# Patient Record
Sex: MALE | Race: WHITE | NOT HISPANIC OR LATINO | Employment: OTHER | URBAN - NONMETROPOLITAN AREA
[De-identification: names, ages, dates, MRNs, and addresses within clinical notes are randomized per-mention and may not be internally consistent; named-entity substitution may affect disease eponyms.]

---

## 2018-01-19 ENCOUNTER — OFFICE VISIT CONVERTED (OUTPATIENT)
Dept: FAMILY MEDICINE CLINIC | Age: 83
End: 2018-01-19
Attending: FAMILY MEDICINE

## 2018-01-20 LAB
ALBUMIN SERPL-MCNC: 3.8 G/DL
ALBUMIN/GLOB SERPL: 1.7 {RATIO}
ALP SERPL-CCNC: 62 IU/L
ALT SERPL-CCNC: 15 IU/L
AST SERPL-CCNC: 24 IU/L
BILIRUB SERPL-MCNC: 0.4 MG/DL
BUN SERPL-MCNC: 20 MG/DL
BUN/CREAT SERPL: 24
CALCIUM SERPL-MCNC: 9.4 MG/DL
CHLORIDE SERPL-SCNC: 104 MMOL/L
CO2 SERPL-SCNC: 27 MMOL/L
CONV TOTAL PROTEIN: 6.1 G/DL
CREAT UR-MCNC: 0.85 MG/DL
GLOBULIN UR ELPH-MCNC: 2.3 G/DL
GLUCOSE SERPL-MCNC: 101 MG/DL
POTASSIUM SERPL-SCNC: 3.8 MMOL/L
SODIUM SERPL-SCNC: 145 MMOL/L

## 2018-07-19 ENCOUNTER — OFFICE VISIT CONVERTED (OUTPATIENT)
Dept: FAMILY MEDICINE CLINIC | Age: 83
End: 2018-07-19
Attending: FAMILY MEDICINE

## 2018-07-21 LAB
ALBUMIN SERPL-MCNC: 3.7 G/DL
ALBUMIN/GLOB SERPL: 1.5 {RATIO}
ALP SERPL-CCNC: 89 IU/L
ALT SERPL-CCNC: 24 IU/L
AST SERPL-CCNC: 33 IU/L
BASOPHILS # BLD AUTO: 0.1 X10E3/UL
BASOPHILS NFR BLD AUTO: 1 %
BILIRUB SERPL-MCNC: 0.5 MG/DL
BUN SERPL-MCNC: 14 MG/DL
BUN/CREAT SERPL: 15
CALCIUM SERPL-MCNC: 8.9 MG/DL
CHLORIDE SERPL-SCNC: 102 MMOL/L
CO2 SERPL-SCNC: 26 MMOL/L
CONV IMMATURE GRAN: 0 %
CONV TOTAL PROTEIN: 6.1 G/DL
CREAT UR-MCNC: 0.93 MG/DL
EOSINOPHIL # BLD AUTO: 0.1 X10E3/UL
EOSINOPHIL # BLD AUTO: 3 %
ERYTHROCYTE [DISTWIDTH] IN BLOOD BY AUTOMATED COUNT: 14.4 %
GLOBULIN UR ELPH-MCNC: 2.4 G/DL
GLUCOSE SERPL-MCNC: 99 MG/DL
HCT VFR BLD AUTO: 41.6 %
HGB BLD-MCNC: 14.7 G/DL
IMM GRANULOCYTES # BLD: 0 X10E3/UL
LYMPHOCYTES # BLD AUTO: 1.6 X10E3/UL
LYMPHOCYTES NFR BLD AUTO: 42 %
MCH RBC QN AUTO: 34.5 PG
MCHC RBC AUTO-ENTMCNC: 35.3 G/DL
MCV RBC AUTO: 98 FL
MONOCYTES # BLD AUTO: 0.4 X10E3/UL
MONOCYTES NFR BLD AUTO: 10 %
NEUTROPHILS # BLD AUTO: 1.7 X10E3/UL
NEUTROPHILS NFR BLD AUTO: 44 %
PLATELET # BLD AUTO: 154 X10E3/UL
POTASSIUM SERPL-SCNC: 3.9 MMOL/L
RBC # BLD AUTO: 4.26 X10E6/UL
SODIUM SERPL-SCNC: 141 MMOL/L
WBC # BLD AUTO: 3.9 X10E3/UL

## 2019-01-14 ENCOUNTER — HOSPITAL ENCOUNTER (OUTPATIENT)
Dept: OTHER | Facility: HOSPITAL | Age: 84
Discharge: HOME | End: 2019-01-14
Attending: FAMILY MEDICINE

## 2019-01-21 ENCOUNTER — OFFICE VISIT CONVERTED (OUTPATIENT)
Dept: FAMILY MEDICINE CLINIC | Age: 84
End: 2019-01-21
Attending: FAMILY MEDICINE

## 2019-01-21 ENCOUNTER — HOSPITAL ENCOUNTER (OUTPATIENT)
Dept: OTHER | Facility: HOSPITAL | Age: 84
Discharge: HOME | End: 2019-01-21
Attending: FAMILY MEDICINE

## 2019-01-21 LAB
ALBUMIN SERPL-MCNC: 4 G/DL (ref 3.5–5)
ALBUMIN/GLOB SERPL: 1.5 {RATIO} (ref 1.4–2.6)
ALP SERPL-CCNC: 66 U/L (ref 56–155)
ALT SERPL-CCNC: 14 U/L (ref 10–40)
ANION GAP SERPL CALC-SCNC: 13 MMOL/L (ref 8–19)
AST SERPL-CCNC: 25 U/L (ref 15–50)
BILIRUB SERPL-MCNC: 0.32 MG/DL (ref 0.2–1.3)
BUN SERPL-MCNC: 17 MG/DL (ref 5–25)
BUN/CREAT SERPL: 21 {RATIO} (ref 6–20)
CALCIUM SERPL-MCNC: 9.8 MG/DL (ref 8.7–10.4)
CHLORIDE SERPL-SCNC: 103 MMOL/L (ref 99–111)
CONV CO2: 31 MMOL/L (ref 22–32)
CONV TOTAL PROTEIN: 6.6 G/DL (ref 6.3–8.2)
CREAT UR-MCNC: 0.82 MG/DL (ref 0.7–1.2)
GFR SERPLBLD BASED ON 1.73 SQ M-ARVRAT: >60 ML/MIN/{1.73_M2}
GLOBULIN UR ELPH-MCNC: 2.6 G/DL (ref 2–3.5)
GLUCOSE SERPL-MCNC: 95 MG/DL (ref 70–99)
OSMOLALITY SERPL CALC.SUM OF ELEC: 297 MOSM/KG (ref 273–304)
POTASSIUM SERPL-SCNC: 4.3 MMOL/L (ref 3.5–5.3)
SODIUM SERPL-SCNC: 143 MMOL/L (ref 135–147)

## 2019-07-24 ENCOUNTER — OFFICE VISIT CONVERTED (OUTPATIENT)
Dept: FAMILY MEDICINE CLINIC | Age: 84
End: 2019-07-24
Attending: FAMILY MEDICINE

## 2019-08-21 ENCOUNTER — HOSPITAL ENCOUNTER (OUTPATIENT)
Dept: OTHER | Facility: HOSPITAL | Age: 84
Discharge: HOME | End: 2019-08-21
Attending: FAMILY MEDICINE

## 2019-08-21 LAB
ANION GAP SERPL CALC-SCNC: 18 MMOL/L (ref 8–19)
BUN SERPL-MCNC: 18 MG/DL (ref 5–25)
BUN/CREAT SERPL: 20 {RATIO} (ref 6–20)
CALCIUM SERPL-MCNC: 9.2 MG/DL (ref 8.7–10.4)
CHLORIDE SERPL-SCNC: 105 MMOL/L (ref 99–111)
CONV CO2: 26 MMOL/L (ref 22–32)
CREAT UR-MCNC: 0.9 MG/DL (ref 0.7–1.2)
GFR SERPLBLD BASED ON 1.73 SQ M-ARVRAT: >60 ML/MIN/{1.73_M2}
GLUCOSE SERPL-MCNC: 81 MG/DL (ref 70–99)
OSMOLALITY SERPL CALC.SUM OF ELEC: 301 MOSM/KG (ref 273–304)
POTASSIUM SERPL-SCNC: 3.9 MMOL/L (ref 3.5–5.3)
SODIUM SERPL-SCNC: 145 MMOL/L (ref 135–147)

## 2020-01-22 ENCOUNTER — OFFICE VISIT CONVERTED (OUTPATIENT)
Dept: FAMILY MEDICINE CLINIC | Age: 85
End: 2020-01-22
Attending: FAMILY MEDICINE

## 2020-01-22 ENCOUNTER — HOSPITAL ENCOUNTER (OUTPATIENT)
Dept: OTHER | Facility: HOSPITAL | Age: 85
Discharge: HOME | End: 2020-01-22
Attending: FAMILY MEDICINE

## 2020-01-22 LAB
ALBUMIN SERPL-MCNC: 3.8 G/DL (ref 3.5–5)
ALBUMIN/GLOB SERPL: 1.5 {RATIO} (ref 1.4–2.6)
ALP SERPL-CCNC: 67 U/L (ref 56–155)
ALT SERPL-CCNC: 21 U/L (ref 10–40)
ANION GAP SERPL CALC-SCNC: 17 MMOL/L (ref 8–19)
AST SERPL-CCNC: 27 U/L (ref 15–50)
BILIRUB SERPL-MCNC: 0.43 MG/DL (ref 0.2–1.3)
BUN SERPL-MCNC: 18 MG/DL (ref 5–25)
BUN/CREAT SERPL: 19 {RATIO} (ref 6–20)
CALCIUM SERPL-MCNC: 9.4 MG/DL (ref 8.7–10.4)
CHLORIDE SERPL-SCNC: 104 MMOL/L (ref 99–111)
CONV CO2: 27 MMOL/L (ref 22–32)
CONV TOTAL PROTEIN: 6.4 G/DL (ref 6.3–8.2)
CREAT UR-MCNC: 0.95 MG/DL (ref 0.7–1.2)
GFR SERPLBLD BASED ON 1.73 SQ M-ARVRAT: >60 ML/MIN/{1.73_M2}
GLOBULIN UR ELPH-MCNC: 2.6 G/DL (ref 2–3.5)
GLUCOSE SERPL-MCNC: 72 MG/DL (ref 70–99)
OSMOLALITY SERPL CALC.SUM OF ELEC: 298 MOSM/KG (ref 273–304)
POTASSIUM SERPL-SCNC: 4.1 MMOL/L (ref 3.5–5.3)
SODIUM SERPL-SCNC: 144 MMOL/L (ref 135–147)

## 2020-03-04 ENCOUNTER — OFFICE VISIT CONVERTED (OUTPATIENT)
Dept: FAMILY MEDICINE CLINIC | Age: 85
End: 2020-03-04
Attending: FAMILY MEDICINE

## 2020-08-10 ENCOUNTER — HOSPITAL ENCOUNTER (OUTPATIENT)
Dept: OTHER | Facility: HOSPITAL | Age: 85
Discharge: HOME | End: 2020-08-10
Attending: FAMILY MEDICINE

## 2020-08-10 ENCOUNTER — OFFICE VISIT CONVERTED (OUTPATIENT)
Dept: FAMILY MEDICINE CLINIC | Age: 85
End: 2020-08-10
Attending: FAMILY MEDICINE

## 2020-08-10 LAB
ALBUMIN SERPL-MCNC: 3.8 G/DL (ref 3.5–5)
ALBUMIN/GLOB SERPL: 1.5 {RATIO} (ref 1.4–2.6)
ALP SERPL-CCNC: 62 U/L (ref 56–155)
ALT SERPL-CCNC: 16 U/L (ref 10–40)
ANION GAP SERPL CALC-SCNC: 17 MMOL/L (ref 8–19)
APPEARANCE UR: CLEAR
AST SERPL-CCNC: 26 U/L (ref 15–50)
BASOPHILS # BLD MANUAL: 0.05 10*3/UL (ref 0–0.2)
BASOPHILS NFR BLD MANUAL: 1 % (ref 0–3)
BILIRUB SERPL-MCNC: 0.32 MG/DL (ref 0.2–1.3)
BILIRUB UR QL: NEGATIVE
BUN SERPL-MCNC: 18 MG/DL (ref 5–25)
BUN/CREAT SERPL: 19 {RATIO} (ref 6–20)
CALCIUM SERPL-MCNC: 10.2 MG/DL (ref 8.7–10.4)
CHLORIDE SERPL-SCNC: 103 MMOL/L (ref 99–111)
COLOR UR: YELLOW
CONV BACTERIA: NEGATIVE
CONV CO2: 26 MMOL/L (ref 22–32)
CONV COLLECTION SOURCE (UA): NORMAL
CONV TOTAL PROTEIN: 6.3 G/DL (ref 6.3–8.2)
CONV UROBILINOGEN IN URINE BY AUTOMATED TEST STRIP: 0.2 {EHRLICHU}/DL (ref 0.1–1)
CREAT UR-MCNC: 0.96 MG/DL (ref 0.7–1.2)
DEPRECATED RDW RBC AUTO: 49.1 FL
EOSINOPHIL # BLD MANUAL: 0.11 10*3/UL (ref 0–0.7)
EOSINOPHIL NFR BLD MANUAL: 2.1 % (ref 0–7)
ERYTHROCYTE [DISTWIDTH] IN BLOOD BY AUTOMATED COUNT: 13.5 % (ref 11.5–14.5)
GFR SERPLBLD BASED ON 1.73 SQ M-ARVRAT: >60 ML/MIN/{1.73_M2}
GLOBULIN UR ELPH-MCNC: 2.5 G/DL (ref 2–3.5)
GLUCOSE SERPL-MCNC: 117 MG/DL (ref 70–99)
GLUCOSE UR QL: NEGATIVE MG/DL
GRANS (ABSOLUTE): 2.53 10*3/UL (ref 2–8)
GRANS: 48.2 % (ref 30–85)
HBA1C MFR BLD: 15 G/DL (ref 14–18)
HCT VFR BLD AUTO: 42.2 % (ref 42–52)
HGB UR QL STRIP: NEGATIVE
IMM GRANULOCYTES # BLD: 0.01 10*3/UL (ref 0–0.54)
IMM GRANULOCYTES NFR BLD: 0.2 % (ref 0–0.43)
KETONES UR QL STRIP: NEGATIVE MG/DL
LEUKOCYTE ESTERASE UR QL STRIP: NEGATIVE
LYMPHOCYTES # BLD MANUAL: 2 10*3/UL (ref 1–5)
LYMPHOCYTES NFR BLD MANUAL: 10.3 % (ref 3–10)
MCH RBC QN AUTO: 34.6 PG (ref 27–31)
MCHC RBC AUTO-ENTMCNC: 35.5 G/DL (ref 33–37)
MCV RBC AUTO: 97.2 FL (ref 80–96)
MONOCYTES # BLD AUTO: 0.54 10*3/UL (ref 0.2–1.2)
NITRITE UR QL STRIP: NEGATIVE
OSMOLALITY SERPL CALC.SUM OF ELEC: 297 MOSM/KG (ref 273–304)
PH UR STRIP.AUTO: 7.5 [PH] (ref 5–8)
PLATELET # BLD AUTO: 183 10*3/UL (ref 130–400)
PMV BLD AUTO: 9.4 FL (ref 7.4–10.4)
POTASSIUM SERPL-SCNC: 4.1 MMOL/L (ref 3.5–5.3)
PROT UR QL: NEGATIVE MG/DL
RBC # BLD AUTO: 4.34 10*6/UL (ref 4.7–6.1)
RBC #/AREA URNS HPF: NORMAL /[HPF]
SODIUM SERPL-SCNC: 142 MMOL/L (ref 135–147)
SP GR UR: 1.01 (ref 1–1.03)
VARIANT LYMPHS NFR BLD MANUAL: 38.2 % (ref 20–45)
WBC # BLD AUTO: 5.24 10*3/UL (ref 4.8–10.8)
WBC #/AREA URNS HPF: NORMAL /[HPF]

## 2020-08-12 ENCOUNTER — HOSPITAL ENCOUNTER (OUTPATIENT)
Dept: OTHER | Facility: HOSPITAL | Age: 85
Discharge: HOME | End: 2020-08-12
Attending: FAMILY MEDICINE

## 2020-08-13 LAB — PSA SERPL-MCNC: 1.82 NG/ML (ref 0–4)

## 2021-02-05 ENCOUNTER — HOSPITAL ENCOUNTER (OUTPATIENT)
Dept: OTHER | Facility: HOSPITAL | Age: 86
Discharge: HOME | End: 2021-02-05
Attending: FAMILY MEDICINE

## 2021-02-05 LAB
ALBUMIN SERPL-MCNC: 3.5 G/DL (ref 3.5–5)
ALBUMIN/GLOB SERPL: 1.3 {RATIO} (ref 1.4–2.6)
ALP SERPL-CCNC: 67 U/L (ref 56–155)
ALT SERPL-CCNC: 38 U/L (ref 10–40)
ANION GAP SERPL CALC-SCNC: 13 MMOL/L (ref 8–19)
AST SERPL-CCNC: 17 U/L (ref 15–50)
BILIRUB SERPL-MCNC: 0.43 MG/DL (ref 0.2–1.3)
BUN SERPL-MCNC: 13 MG/DL (ref 5–25)
BUN/CREAT SERPL: 15 {RATIO} (ref 6–20)
CALCIUM SERPL-MCNC: 9.4 MG/DL (ref 8.7–10.4)
CHLORIDE SERPL-SCNC: 106 MMOL/L (ref 99–111)
CONV CO2: 25 MMOL/L (ref 22–32)
CONV TOTAL PROTEIN: 6.2 G/DL (ref 6.3–8.2)
CREAT UR-MCNC: 0.87 MG/DL (ref 0.7–1.2)
GFR SERPLBLD BASED ON 1.73 SQ M-ARVRAT: >60 ML/MIN/{1.73_M2}
GLOBULIN UR ELPH-MCNC: 2.7 G/DL (ref 2–3.5)
GLUCOSE SERPL-MCNC: 118 MG/DL (ref 70–99)
OSMOLALITY SERPL CALC.SUM OF ELEC: 289 MOSM/KG (ref 273–304)
POTASSIUM SERPL-SCNC: 4.8 MMOL/L (ref 3.5–5.3)
SODIUM SERPL-SCNC: 139 MMOL/L (ref 135–147)

## 2021-02-06 LAB — 25(OH)D3 SERPL-MCNC: 70.5 NG/ML (ref 30–100)

## 2021-05-18 NOTE — PROGRESS NOTES
"Anirudh Lomeli. 1935     Office/Outpatient Visit    Visit Date: Thu, Jul 19, 2018 09:13 am    Provider: Magali Worrell MD (Assistant: Nena Dominguez RN)    Location: [facility]        Electronically signed by Magali Worrell MD on  07/19/2018 09:47:46 AM                             SUBJECTIVE:        CC:     Mr. Lomeli is a 82 year old White male.  6 month check up HTN, osteoporosis, AKASH, allergies         HPI: Anirudh is here to follow-up on chronic issues.        He is not currently on meds for HTN.  Has been on spironolactone previously.  BP has been well controlled.  No CP, palpitations, SOB.        He has AKASH, compliant with CPAP.  He just saw his pulmonologist last week.        He is on Prolia injections for osteoporosis.  Last DEXA was 1/2017 and showed improvement to osteopenia in both hip and spine.         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  He has had right \"sore foot\" that has been bothering him for years.  He stretches it but that makes it worse.  It worsens throughout the day.  He has not had a new brace for several years now.  The past 6-8 months, he has been having more pain.  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        He is on Claritin and Flonase for allergies.     ROS:     CONSTITUTIONAL:  Positive for unintentional weight gain ( gradual ).   Negative for fatigue or fever.      EYES:  Negative for blurred vision.      E/N/T:  Positive for diminished hearing ( bilaterally; hearing aids ).   Negative for nasal congestion or frequent rhinorrhea.      CARDIOVASCULAR:  Negative for chest pain and palpitations.      RESPIRATORY:  Negative for recent cough and dyspnea.      GASTROINTESTINAL:  Negative for abdominal pain, constipation, diarrhea, nausea and vomiting.      MUSCULOSKELETAL:  Negative for arthralgias and myalgias.      NEUROLOGICAL:  Positive for ataxia and weakness.   Negative for paresthesias.      " PSYCHIATRIC:  Negative for anxiety, depression and sleep disturbance.          PMH/FMH/SH:     Last Reviewed on 7/19/2018 09:20 AM by Magali Worrell    Past Medical History:             PAST MEDICAL HISTORY         Positive for    Hyperlipidemia;     Positive for    Sleep Apnea;     Positive for    Renal Stones;     Positive for    Post Polio;         PAST MEDICAL HISTORY             CURRENT MEDICAL PROVIDERS:    Urologist: Dr. meng         PREVENTIVE HEALTH MAINTENANCE             BONE DENSITY: was last done 01/2017 with the following abnormality noted-- osteopenia     COLORECTAL CANCER SCREENING: Up to date (colonoscopy q10y; sigmoidoscopy q5y; Cologuard q3y) was last done 4/26/11, Results are in chart     DENTAL CLEANING: was last done 2015     EYE EXAM: was last done 2015     Prolia Injection : 3/7/18     PSA: was last done 7/20/17 with normal results         PAST MEDICAL HISTORY                 ADVANCED DIRECTIVES: None         Surgical History:         Cholecystectomy: 1984;      Bilateral upper lid blepharoplasty;    Left ankle stabliizaton;         Family History:     Father: CVA     Brother(s): Coronary Artery Disease; Hyperlipidemia         Social History:     Occupation: Artist     Marital Status:      Children: 2 children         Tobacco/Alcohol/Supplements:     Last Reviewed on 7/19/2018 09:20 AM by Magali Worrell    Tobacco: Current Smoker: He currently smokes some days, Cigars.          Substance Abuse History:     Last Reviewed on 7/19/2018 09:20 AM by Magali Worrell        Mental Health History:     Last Reviewed on 7/19/2018 09:20 AM by Magali Worrell        Communicable Diseases (eg STDs):     Last Reviewed on 7/19/2018 09:20 AM by Magali Worrell            Current Problems:     Last Reviewed on 1/19/2018 08:40 AM by Magali Worrell    Personal history of other drug therapy     Postmenopausal osteoporosis     Testosterone deficiency     Fatigue     Osteoporosis, other      Age-related hearing loss     Post-polio syndrome     Hypertension     Sleep related hypoxia     AKASH     Hyperlipidemia     Kidney stone         Immunizations:     Td adult 9/20/2005     zzPrevnar-13 2/23/2016     Fluarix pf 10/1/2014     Pneomovax 10/2/2006     Fluzone High-Dose pf (>=65 yr) 10/18/2016     Influenza Virus Vaccine, unspecified formulation 10/1/2017     Zostavax (Zoster) 6/16/2007         Allergies:     Last Reviewed on 7/19/2018 09:17 AM by Nena Dominguez      No Known Drug Allergies.         Current Medications:     Last Reviewed on 7/19/2018 09:18 AM by Nena Dominguez    Potassium Chloride 10mEq Capsules, Extended Release 1 capsule daily     Cetirizine HCl 10mg Tablet 1 tab daily     Flonase Allergy Relief 50mcg/1actuation Nasal Spray     citracal 1 bid     Niacin (Nicotinic Acid) 500mg Tablet Take 2 tablet(s) by mouth bid     probiotic 1 po daily     stool softener 1 po daily     Vitamin B12 1,000mcg Tablet 1/2 tab daily     Vitamin D3 1,000IU Tablet 1 tab daily         OBJECTIVE:        Vitals:         Current: 7/19/2018 9:15:56 AM    Ht:  5 ft, 6 in;  Wt: 155.4 lbs;  BMI: 25.1    T: 97.4 F (oral);  BP: 129/64 mm Hg (left arm, sitting);  P: 54 bpm (left arm (BP Cuff), sitting);  sCr: 0.85 mg/dL;  GFR: 59.14        Exams:     PHYSICAL EXAM:     GENERAL: Vitals recorded well developed, well nourished;  well groomed;  no apparent distress;     EYES: extraocular movements intact; conjunctiva and cornea are normal; PERRLA;     E/N/T: OROPHARYNX: posterior pharynx shows no exudate and cobblestoning;     RESPIRATORY: normal respiratory rate and pattern with no distress; normal breath sounds with no rales, rhonchi, wheezes or rubs;     CARDIOVASCULAR: normal rate; rhythm is regular;  no systolic murmur; no edema;     GASTROINTESTINAL: nontender; normal bowel sounds;     MUSCULOSKELETAL: gait: uses a cane;  normal overall tone brace on RLE;         ASSESSMENT           401.1   I10  Hypertension               DDx:     729.5   M79.675  Foot pain              DDx:     780.57   G47.33  AKSAH              DDx:     733.09   M81.8  Osteoporosis, other              DDx:         ORDERS:         Lab Orders:       APPTO  Appointment need  (In-House)         [id number]  [id number] [facility] CBC with Differential/Platelet  (Send-Out)         [id number]  [id number] [facility] Comp Metabolic Panel (14)  (Send-Out)           Other Orders:         Calculated BMI above the upper parameter and a follow-up plan was documented in the medical record  (In-House)                   PLAN:          Hypertension BP at goal off meds.  No changes.  Continue to monitor.  Due for routine labs, ordered.  RTC 6 months for AWV.     LABORATORY:  Labs ordered to be performed today at [facility] include CBC.  CMP MIPS     BMI Elevated - Follow-Up Plan: He was provided education on weight loss strategies     FOLLOW-UP: Schedule a follow-up visit in 6 months..  Medicare wellness 30 min with [place]           Orders:       APPTO  Appointment need  (In-House)         [id number]  [id number] [facility] CBC with Differential/Platelet  (Send-Out)         [id number]  [id number] [facility] Comp Metabolic Panel (14)  (Send-Out)           Calculated BMI above the upper parameter and a follow-up plan was documented in the medical record  (In-House)            Foot pain Recommend going in to Dr. Hamilton/[place] for evaluation given his history of ankle fusion from post-polio syndrome. He has an appt there on Monday already scheduled.          AKASH Compliant with CPAP.          Osteoporosis, other Stable on Prolia.  Due for DEXA next year.           Patient Education Handouts:       Prolia              Patient Recommendations:        For  Hypertension:     Schedule a follow-up visit in 6 months.                APPOINTMENT INFORMATION:        Monday Tuesday Wednesday Thursday Friday Saturday Sunday            Time:___________________AM  PM   Date:_____________________             CHARGE CAPTURE            **Please note: ICD descriptions below are intended for billing purposes only and may not represent clinical diagnoses**        Primary Diagnosis:         401.1 Hypertension            I10    Essential (primary) hypertension              Orders:          APPTO   Appointment need  (In-House)                Calculated BMI above the upper parameter and a follow-up plan was documented in the medical record  (In-House)             [id number]   Office/outpatient visit; established patient, level 4  (In-House)           729.5 Foot pain            M79.675    Pain in left toe(s)    780.57 AKASH            G47.33    Obstructive sleep apnea (adult) (pediatric)    733.09 Osteoporosis, other            M81.8    Other osteoporosis without current pathological fracture

## 2021-05-18 NOTE — PROGRESS NOTES
Anirudh Lomeli  1935     Office/Outpatient Visit    Visit Date: Wed, Mar 4, 2020 11:10 am    Provider: Magali Worrell MD (Assistant: Macy Naranjo,  )    Location: [facility]        Electronically signed by Magali Worrell MD on  03/04/2020 11:36:37 AM                             Subjective:        CC: Mr. Lomeli is a 84 year old White male.  Hemmorroids         HPI: Anirudh is here today with complaint of hemorrhoids.  He had a history of hemorrhoids in the past.  One of them was internal.  He has had a lot of constipation lately.  He has been working on dietary modifications to improve this, which is slowly working.  He has noticed some rectal lumps over the past 3 days.  There was a soft spot on the outside and a hard one on the inside.  No blood in the stool.  Neither hemorrhoid are painful or tender.  Occasionally painful bowel movements.    ROS:     CONSTITUTIONAL:  Negative for fatigue and fever.      CARDIOVASCULAR:  Negative for chest pain.      RESPIRATORY:  Negative for dyspnea.      GASTROINTESTINAL:  Positive for hemorrhoids.   Negative for abdominal pain, constipation or diarrhea.      MUSCULOSKELETAL:  Negative for arthralgias and myalgias.          Past Medical History / Family History / Social History:         Last Reviewed on 3/04/2020 11:20 AM by Magali Worrell    Past Medical History:             PAST MEDICAL HISTORY         Positive for    Hyperlipidemia;     Positive for    Sleep Apnea;     Positive for    Renal Stones;     Positive for    Post Polio;         PAST MEDICAL HISTORY             CURRENT MEDICAL PROVIDERS:    Urologist: Dr. meng         PREVENTIVE HEALTH MAINTENANCE             BONE DENSITY: was last done 1/14/2019 with the following abnormality noted-- osteopenia with osteoporosis R trochanter     COLORECTAL CANCER SCREENING: Up to date (colonoscopy q10y; sigmoidoscopy q5y; Cologuard q3y) was last done 4/26/11, Results are in chart; colonoscopy with the  following abnormalities noted-- Diverticulosis     DENTAL CLEANING: was last done 2015     EYE EXAM: was last done 2015     Prolia Injection : 1st inj 6/2014, last inj 9/11/19     PSA: was last done 7/20/17 with normal results         PAST MEDICAL HISTORY                 ADVANCED DIRECTIVES: None         Surgical History:         Cholecystectomy: 1984;     Bilateral upper lid blepharoplasty;    Left ankle stabliizaton;         Family History:     Father: CVA     Brother(s): Coronary Artery Disease; Hyperlipidemia         Social History:     Occupation: Artist     Marital Status:      Children: 2 children         Tobacco/Alcohol/Supplements:     Last Reviewed on 3/04/2020 11:20 AM by Magali Worrell    Tobacco: Current Smoker: He currently smokes some days, Cigars.          Substance Abuse History:     Last Reviewed on 3/04/2020 11:20 AM by Magali Worrell        Mental Health History:     Last Reviewed on 3/04/2020 11:20 AM by Magali Worrell        Communicable Diseases (eg STDs):     Last Reviewed on 3/04/2020 11:20 AM by Magali Worrell        Current Problems:     Last Reviewed on 1/22/2020 08:59 AM by Magali Worrell    Obstructive sleep apnea (adult) (pediatric)    HTN - Essential (primary) hypertension    Postpolio syndrome    Age-related hearing loss - Presbycusis, bilateral    Osteoporosis without current pathological fracture, other    Other fatigue    Testicular hypofunction    Personal history of other drug therapy    Mixed hyperlipidemia    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        Current Medications:     Last Reviewed on 1/22/2020 08:59 AM by Magali Worrell    Vitamin D3 25 mcg (1,000 unit) oral tablet [1 tab daily]    probiotic 1 po daily     niacin 500 mg oral tablet [Take 2 tablet(s) by mouth bid]    cetirizine 10 mg oral tablet [1 tab daily]    Potassium Chloride 10 mEq oral capsule, extended release [1 capsule daily]    citracal 1 bid     Flonase Allergy Relief 50 mcg/actuation Intranasal Spray, Suspension    Prolia 60 mg/mL subcutaneous Syringe [give SQ q 6months DX :M81.0]        Objective:        Vitals:         Current: 3/4/2020 11:13:09 AM    Ht:  5 ft, 6 in;  Wt: 157 lbs;  BMI: 25.3T: 97.5 F (oral);  BP: 117/81 mm Hg (left arm, sitting);  P: 90 bpm (left arm (BP Cuff), sitting);  sCr: 0.95 mg/dL;  GFR: 51.39        Exams:     PHYSICAL EXAM:     GENERAL: Vitals recorded well developed, well nourished;  well groomed;  no apparent distress;     EYES: extraocular movements intact; conjunctiva and cornea are normal; PERRLA;     RESPIRATORY: normal respiratory rate and pattern with no distress;     GASTROINTESTINAL: rectal exam: (+) internal hemorrhoid(s) and external hemorrhoid present at 5 o'clock o'clock;     MUSCULOSKELETAL: gait: uses a cane;  normal overall tone brace on RLE;         Assessment:         K64.9   Unspecified hemorrhoids           Plan:         Unspecified hemorrhoidsAnirudh has 2 palpable hemorrhoids, one external at 5 o'clock, nontender, and one internal at 7 o'clock, nontender.  Both are mobile.  Non-bleeding.  He has had a lot of constipation lately.  Continue working on improving this; start Miralax daily for now to see if this helps.  Can also try Sitz baths.  If sx worsen with the hemorrhoids, we can always get him in with a general surgeon, but for now will  practice watchful waiting.  He is worried primarily about colon cancer, which I think is very unlikely.  Continue to monitor.            Charge Capture:         Primary Diagnosis:     K64.9  Unspecified hemorrhoids           Orders:      [id number]  Office/outpatient visit; established patient, level 3  (In-House)

## 2021-05-18 NOTE — PROGRESS NOTES
Anirudh Lomeli 1935     Office/Outpatient Visit    Visit Date: Fri, Jan 19, 2018 08:23 am    Provider: Magali Worrell MD (Assistant: Maer Villegas MA)    Location: [facility]        Electronically signed by Magali Worrell MD on  01/19/2018 01:44:44 PM                             SUBJECTIVE:        CC: Medicare Wellness Visit         HPI:     He is UTD on colonoscopy, last done 4/2011 and this showed diverticulosis.  Prostate cancer screening is no longer indicated by age.  PSA was last done 7/2017 and was normal.  He is UTD on DEXA, last done 1/2017 and this showed osteopenia.  He is on Prolia injections.  He is UTD on Pneumovax (10/2006), Prevnar (2/2016), Zostavax (6/2007), and flu (10/2017).  He is due for tetanus.  He is due for routine labs including CMP.     Mr. Lomeli is here for a Medicare wellness visit.  In regard to the Five Item Geriatric Depression Scale, he reports feeling helpless, but not dissatisfaction with his life, getting bored often, preferring to stay at home rather than going out and doing new things or feeling worthless the way he is now.  Total score is 1 Returning to health checkup, individual and family medical history was reviewed and updated A list of current providers and suppliers reviewed and updated A current height, weight, BMI, blood pressure, and pulse were recorded in the vitals section of the note and have been reviewed A review of possible cognitive impairment was performed and the following was noted: he is not having trouble driving;  memory changes are noted;  he is not having trouble with his finances A review of functional ability and level of safety was performed and was negative In regard to hearing, he reports having trouble hearing the TV/radio when others do not, having to strain to hear or understand conversations and wearing hearing aid(s).  Concerning home safety, he reports that at home he DOES have grab bars in the bath, handrails on  stairs and functioning smoke alarms, but not throw rugs, poor lighting or a slippery bath or shower.      Immunization Status: ** >10 years since last Td booster;     Physical Activity: Exercises at least 3 times per week; Has not had any falls or only one fall without injury in the past year.  Advance Care Directive updated today in PM Tobacco: Current Smoker: He currently smokes some days, Cigars.    Preventative Health updated today.      Score of 1, not suggestive of depression.     ROS:     CONSTITUTIONAL:  Positive for fatigue.   Negative for fever.      EYES:  Negative for blurred vision.      E/N/T:  Positive for diminished hearing ( bilaterally; hearing aids ).   Negative for nasal congestion or frequent rhinorrhea.      CARDIOVASCULAR:  Negative for chest pain and palpitations.      RESPIRATORY:  Negative for recent cough and dyspnea.      GASTROINTESTINAL:  Negative for abdominal pain, constipation, diarrhea, nausea and vomiting.      MUSCULOSKELETAL:  Negative for arthralgias and myalgias.      NEUROLOGICAL:  Positive for ataxia and weakness.   Negative for paresthesias.      PSYCHIATRIC:  Negative for anxiety, depression and sleep disturbance.          PM/FM/:     Last Reviewed on 1/19/2018 08:40 AM by Magali Worrell    Past Medical History:             PAST MEDICAL HISTORY         Positive for    Hyperlipidemia;     Positive for    Sleep Apnea;     Positive for    Renal Stones;     Positive for    Post Polio;         PAST MEDICAL HISTORY             CURRENT MEDICAL PROVIDERS:    Urologist: Dr. meng         PREVENTIVE HEALTH MAINTENANCE             BONE DENSITY: was last done 01/2017 with the following abnormality noted-- osteopenia     COLORECTAL CANCER SCREENING: Up to date (colonoscopy q10y; sigmoidoscopy q5y; Cologuard q3y) was last done 4/26/11, Results are in chart     DENTAL CLEANING: was last done 2015     EYE EXAM: was last done 2015     Prolia Injection : 8-29-17     PSA: was last  done 7/20/17 with normal results         PAST MEDICAL HISTORY                 ADVANCED DIRECTIVES: None         Surgical History:         Cholecystectomy: 1984;      Bilateral upper lid blepharoplasty;    Left ankle stabliizaton;         Family History:     Father: CVA     Brother(s): Coronary Artery Disease; Hyperlipidemia         Social History:     Occupation: Artist     Marital Status:      Children: 2 children         Tobacco/Alcohol/Supplements:     Last Reviewed on 1/19/2018 08:40 AM by Magali Worrell    Tobacco: Current Smoker: He currently smokes some days, Cigars.          Substance Abuse History:     Last Reviewed on 1/19/2018 08:40 AM by Magali Worrell        Mental Health History:     Last Reviewed on 1/19/2018 08:40 AM by Magali Worrell        Communicable Diseases (eg STDs):     Last Reviewed on 1/19/2018 08:40 AM by Magali Worrell            Current Problems:     Last Reviewed on 7/20/2017 09:29 AM by Magali Worrell    Personal history of other drug therapy     Postmenopausal osteoporosis     Testosterone deficiency     Fatigue     Osteoporosis, other     Age-related hearing loss     Post-polio syndrome     Hypertension     Sleep related hypoxia     AKASH     Hyperlipidemia     Kidney stone         Immunizations:     Td adult 9/20/2005     Prevnar-13 2/23/2016     Fluarix pf 10/1/2014     Pneomovax 10/2/2006     Fluzone High-Dose pf (>=65 yr) 10/18/2016     Zostavax (Zoster) 6/16/2007         Allergies:     Last Reviewed on 7/20/2017 09:29 AM by Magali Worrell      No Known Drug Allergies.         Current Medications:     Last Reviewed on 7/20/2017 09:29 AM by Magali Worrell    Potassium Chloride 10mEq Capsules, Extended Release 1 capsule daily     Aspirin (ASA) 325mg Tablets, Enteric Coated Take 1 tablet(s) by mouth qam prn     Cetirizine HCl 10mg Tablet 1 tab daily     Flonase Allergy Relief 50mcg/1actuation Nasal Spray     citracal 1 bid     Niacin (Nicotinic Acid) 500mg Tablet Take 2  tablet(s) by mouth bid     probiotic 1 po daily     stool softener 1 po daily     Vitamin B12 1,000mcg Tablet 1/2 tab daily     Vitamin D3 1,000IU Tablet 1 tab daily         OBJECTIVE:        Vitals:         Current: 1/19/2018 8:25:28 AM    Ht:  5 ft, 6 in;  Wt: 156.8 lbs;  BMI: 25.3    T: 97.1 F (oral);  BP: 125/70 mm Hg (left arm, sitting);  P: 69 bpm (left arm (BP Cuff), sitting);  sCr: 0.87 mg/dL;  GFR: 58.00    VA: 20/30 OD, 20/40 OS (near, with correction)        Exams:     PHYSICAL EXAM:     GENERAL: Vitals recorded well developed, well nourished;  well groomed;  no apparent distress;     EYES: extraocular movements intact; conjunctiva and cornea are normal; PERRLA;     E/N/T: OROPHARYNX: posterior pharynx shows no exudate and cobblestoning;     RESPIRATORY: normal respiratory rate and pattern with no distress; normal breath sounds with no rales, rhonchi, wheezes or rubs;     CARDIOVASCULAR: normal rate; rhythm is regular;  no systolic murmur; no edema;     GASTROINTESTINAL: nontender; normal bowel sounds;     LYMPHATIC: no enlargement of cervical or facial nodes;     MUSCULOSKELETAL: gait: uses a cane;  normal overall tone brace on RLE;     NEUROLOGIC: mental status: alert and oriented x 3; reflexes: knee jerks: 2+;     PSYCHIATRIC: appropriate affect and demeanor; normal psychomotor function; normal speech pattern;         ASSESSMENT           V70.0   Z00.00  Health checkup              DDx:     V79.0   Z13.89  Screening for depression              DDx:         ORDERS:         Meds Prescribed:       Refill of: Potassium Chloride 10mEq Capsules, Extended Release 1 capsule daily  #90 (Ninety) capsule(s) Refills: 1         Radiology/Test Orders:       3017F  Colorectal CA screen results documented and reviewed (PV)  (In-House)           Lab Orders:       [id number]  [id number] [facility] Comp Metabolic Panel (14)  (Send-Out)           Procedures Ordered:         Annual wellness visit, includes a PPPS, subsequent  visit  (In-House)           Other Orders:       [id number]  Behav assmt w/score & docd/stand instrument  (In-House)           Depression screen negative  (In-House)         1101F  Pt screen for fall risk; document no falls in past year or only 1 fall w/o injury in past year (MARY)  (In-House)           Calculated BMI within normal parameters and documented  (In-House)           Depression Screen Annual Medicare  (In-House)                   PLAN:          Health checkup He is UTD on colonoscopy, last done 4/2011 and this showed diverticulosis.  Prostate cancer screening is no longer indicated by age.  PSA was last done 7/2017 and was normal.  He is UTD on DEXA, last done 1/2017 and this showed osteopenia.  He is on Prolia injections.  He is UTD on Pneumovax (10/2006), Prevnar (2/2016), Zostavax (6/2007), and flu (10/2017).  He is due for tetanus; [facility]  He is due for routine labs including CMP; ordered.  No fall risk, no memory issues, no signs/symptoms of depression.  He lives with his wife.  He is able to drive and perform ADLs/manages finances independently.   Hearing is adequate.  He does not have a living will; information given today on how to obtain one.  Preventive services handout and safety handout were given to him.  Current doctor list updated.  RTC 6 months.     LABORATORY:  Labs ordered to be performed today at [facility] include.  CMP MIPS Negative Depression Screen     COLORECTAL CANCER SCREENING: Results are in chart     BMI Normal - No follow-up plan necessary     FOLLOW-UP: Schedule a follow-up visit in 6 months..            Prescriptions:       Refill of: Potassium Chloride 10mEq Capsules, Extended Release 1 capsule daily  #90 (Ninety) capsule(s) Refills: 1           Orders:       [id number]  [id number] [facility]14[facility]  (Send-Out)         15402  Behav assmt w/score & docd/stand instrument  (In-House)           Depression screen negative   (In-House)         1101F  Pt screen for fall risk; document no falls in past year or only 1 fall w/o injury in past year (MARY)  (In-House)         3017F  Colorectal CA screen results documented and reviewed (PV)  (In-House)           Calculated BMI within normal parameters and documented  (In-House)           Annual wellness visit, includes a PPPS, subsequent visit  (In-House)           Depression Screen Annual Medicare  (In-House)             Patient Education Handouts:       Physical Exam 65+ year, Male.1           Screening for depression Score of 1, not suggestive of depression.             Patient Recommendations:        For  Health checkup:     Schedule a follow-up visit in 6 months.                APPOINTMENT INFORMATION:        Monday Tuesday Wednesday Thursday Friday Saturday Sunday            Time:___________________AM  PM   Date:_____________________             CHARGE CAPTURE           **Please note: ICD descriptions below are intended for billing purposes only and may not represent clinical diagnoses**        Primary Diagnosis:         V70.0 Health checkup            Z00.00    Encntr for general adult medical exam w/o abnormal findings              Orders:          [id number]   Behav assmt w/score & docd/stand instrument  (In-House)                Depression screen negative  (In-House)             1101F   Pt screen for fall risk; document no falls in past year or only 1 fall w/o injury in past year (MARY)  (In-House)             3017F   Colorectal CA screen results documented and reviewed (PV)  (In-House)                Calculated BMI within normal parameters and documented  (In-House)                Annual wellness visit, includes a PPPS, subsequent visit  (In-House)                Depression Screen Annual Medicare  (In-House)           V79.0 Screening for depression            Z13.89    Encounter for screening for other disorder        ADDENDUMS:       ____________________________________    Addendum: 03/21/2018 04:22 PM - Magali Worrell        Please add dx code mixed hyperlipidemia and run the CMP under this.  ThanksCHAYITO        Addendum: 06/14/2018 05:01 PM Magali Peña        Clarification:  CMP should have been ordered under HTN dx I10, not under preventative exam.  Thanks, CHAYITO

## 2021-05-18 NOTE — PROGRESS NOTES
Anirudh Lomeli  1935     Office/Outpatient Visit    Visit Date: Mon, Aug 10, 2020 01:12 pm    Provider: Donaldo Silva MD (Assistant: Yanelis Barrios LPN)    Location: [facility]        Electronically signed by Donaldo Silva MD on  08/10/2020 01:42:25 PM                             Subjective:        CC: Mr. Lomeli is a 84 year old White male.  Back pain around to the stomach         HPI:       Pain in right lower back, travels to right groin/abdomen. This has been present for 2 months and feels like a kidney stone. No blood in urine. He does not think its a kidney stone. This started while he was doing a hip abductor exercise. This is better with aspirin or advil. Worse as he tries to lay on his back.       BP today is 156/68 with a HR of 64. He is not currently on BP meds, he reports he used to be previously.    ROS:     CONSTITUTIONAL:  Negative for fatigue and fever.      EYES:  Negative for blurred vision.      E/N/T:  Positive for diminished hearing ( bilaterally; hearing aids ) and nasal congestion.   Negative for frequent rhinorrhea.      CARDIOVASCULAR:  Negative for chest pain and palpitations.      RESPIRATORY:  Negative for recent cough and dyspnea.      GASTROINTESTINAL:  Positive for abdominal pain ( RLQ ).   Negative for constipation, diarrhea, nausea or vomiting.      GENITOURINARY:  Negative for dysuria, nocturia and change in urine stream.      MUSCULOSKELETAL:  Positive for back pain ( acute; chronic ).   Negative for arthralgias or myalgias.      NEUROLOGICAL:  Positive for ataxia and weakness.   Negative for paresthesias.      PSYCHIATRIC:  Negative for anxiety, depression and sleep disturbance.          Past Medical History / Family History / Social History:         Last Reviewed on 8/10/2020 01:38 PM by Donaldo Silva    Past Medical History:             PAST MEDICAL HISTORY         Positive for    Hyperlipidemia;     Positive for    Sleep Apnea;     Positive  for    Renal Stones;     Positive for    Post Polio;         PAST MEDICAL HISTORY             CURRENT MEDICAL PROVIDERS:    Urologist: Dr. meng         PREVENTIVE HEALTH MAINTENANCE             BONE DENSITY: was last done 1/14/2019 with the following abnormality noted-- osteopenia with osteoporosis R trochanter     COLORECTAL CANCER SCREENING: Up to date (colonoscopy q10y; sigmoidoscopy q5y; Cologuard q3y) was last done 4/26/11, Results are in chart; colonoscopy with the following abnormalities noted-- Diverticulosis     DENTAL CLEANING: was last done 2015     EYE EXAM: was last done 2015     Prolia Injection : 1st inj 6/2014, last inj 3/11/20     PSA: was last done 7/20/17 with normal results         PAST MEDICAL HISTORY                 ADVANCED DIRECTIVES: None         Surgical History:         Cholecystectomy: 1984;     Bilateral upper lid blepharoplasty;    Left ankle stabliizaton;         Family History:     Father: CVA     Brother(s): Coronary Artery Disease; Hyperlipidemia         Social History:     Occupation: Artist     Marital Status:      Children: 2 children         Tobacco/Alcohol/Supplements:     Last Reviewed on 8/10/2020 01:38 PM by Donaldo Silva    Tobacco: Current Smoker: He currently smokes some days, Cigars.          Substance Abuse History:     Last Reviewed on 8/10/2020 01:38 PM by Donaldo Silva        Mental Health History:     Last Reviewed on 8/10/2020 01:38 PM by Donaldo Silva        Communicable Diseases (eg STDs):     Last Reviewed on 8/10/2020 01:38 PM by Donaldo Sivla        Current Problems:     Last Reviewed on 8/10/2020 01:38 PM by Donaldo Silva    Obstructive sleep apnea (adult) (pediatric)    HTN - Essential (primary) hypertension    Postpolio syndrome    Age-related hearing loss - Presbycusis, bilateral    Osteoporosis without current pathological fracture, other    Other fatigue    Testicular hypofunction    Personal history of other drug therapy     Mixed hyperlipidemia    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    Unspecified hemorrhoids    Other osteoporosis without current pathological fracture    Postmenopausal osteoporosis    Personal history of other drug therapy    Low back pain        Immunizations:     Td adult 9/20/2005    zzPrevnar-13 2/23/2016    Fluarix pf 10/1/2014    Pneomovax 10/2/2006    Fluzone High-Dose pf (>=65 yr) 10/18/2016    Fluzone High-Dose pf (>=65 yr) 10/1/2018    Fluzone High-Dose pf (>=65 yr) 10/4/2019    Influenza Virus Vaccine, unspecified formulation 10/1/2017    Zostavax (Zoster live) 6/16/2007        Allergies:     Last Reviewed on 8/10/2020 01:38 PM by Donaldo Silva    No Known Allergies.        Current Medications:     Last Reviewed on 8/10/2020 01:38 PM by Donaldo Silva    Vitamin D3 25 mcg (1,000 unit) oral tablet [1 tab daily]    probiotic 1 po daily     niacin 500 mg oral tablet [Take 2 tablet(s) by mouth bid]    cetirizine 10 mg oral tablet [1 tab daily]    potassium chloride 10 mEq oral capsule, extended release [TAKE 1 CAPSULE DAILY]    citracal 1 bid     Flonase Allergy Relief 50 mcg/actuation Intranasal Spray, Suspension    Prolia 60 mg/mL subcutaneous Syringe [give SQ q 6months DX :M81.0]        Objective:        Vitals:         Current: 8/10/2020 1:19:34 PM    Ht:  5 ft, 6 in;  Wt: 158.4 lbs;  BMI: 25.6T: 97.8 F (oral);  BP: 156/68 mm Hg (left arm, sitting);  P: 64 bpm (left arm (BP Cuff), sitting);  sCr: 0.95 mg/dL;  GFR: 51.58        Exams:     PHYSICAL EXAM:     GENERAL: Vitals recorded well developed, well nourished;  well groomed;  no apparent distress;     EYES: extraocular movements intact; conjunctiva and cornea are normal; PERRLA;     E/N/T: OROPHARYNX: posterior pharynx shows no exudate and cobblestoning;     RESPIRATORY: normal respiratory rate and pattern with no distress; normal breath sounds with no rales, rhonchi, wheezes or rubs;      CARDIOVASCULAR: normal rate; rhythm is regular;  no systolic murmur; no edema;     GASTROINTESTINAL: nontender; normal bowel sounds;     MUSCULOSKELETAL: gait: uses a cane;  normal overall tone brace on RLE; No TTP of right lower back; No CVA tenderness     NEUROLOGIC: mental status: alert and oriented x 3; GROSSLY INTACT     PSYCHIATRIC: appropriate affect and demeanor; normal psychomotor function; normal speech pattern;         Assessment:         M54.5   Low back pain       I10   HTN - Essential (primary) hypertension           ORDERS:         Lab Orders:       [id number]  BDUAM - [facility] Urinalysis, automated, with micro  (Send-Out)            [id number]  BDCB - [facility] CBC with 3 part diff  (Send-Out)            11265  COMP - [facility] Comp. Metabolic Panel  (Send-Out)              Other Orders:       [id number]  CT Renal Stone Protocol (CT abdomen and pelvis w/o IV contrast) no oral prep  (Send-Out)                      Plan:         Low back painCT, UA and basic labs ordered to assess. If normal then this is likely MSK and pt will be referred to PT. Presentation is concerning for kidney stone, which will hopefully pass with time and fluids. If infection is present or if pain worsens with kidney stone pt will be referred to urology.    LABORATORY:  Labs ordered to be performed today include urinalysis with micro.      RADIOLOGY:  I have ordered Test to be ordered CT of CT Renal Stone protocol abdomen and pelvis w/o contrast; no oral prep to be done today.            Orders:       [id number]  BDUAM - [facility] Urinalysis, automated, with micro  (Send-Out)            90000  CT Renal Stone Protocol (CT abdomen and pelvis w/o IV contrast) no oral prep  (Send-Out)              HTN - Essential (primary) hypertensionConsider re-starting meds if HTN persists. HTN today may be 2/2 pain .     LABORATORY:  Labs ordered to be performed today include CBC and Comprehensive metabolic panel.            Orders:       [id number]  BDCB - [facility] CBC with 3 part diff   (Send-Out)            [id number]  [facility] Comp. Metabolic Panel  (Send-Out)                  Charge Capture:         Primary Diagnosis:     M54.5  Low back pain           Orders:      [id number]  Office/outpatient visit; established patient, level 4  (In-House)              I10  HTN - Essential (primary) hypertension

## 2021-05-18 NOTE — PROGRESS NOTES
"Anirudh Lomeli. 1935     Office/Outpatient Visit    Visit Date: Wed, Jul 24, 2019 08:29 am    Provider: Magali Worrell MD (Assistant: Tara Fall MA)    Location: [facility]        Electronically signed by Magali Worrell MD on  07/24/2019 09:01:14 AM                             SUBJECTIVE:        CC:     Mr. Lomeli is a 83 year old White male.  This is a follow-up visit.          HPI: Anirudh is here for routine follow-up of chronic issues.        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  His balance  has been slowly worsening.  Sarah has been \"dragging me out to this exercise.\"  He does think that this helps somewhat.          He has AKASH, compliant with CPAP.        He is on Prolia injections for osteoporosis.  Most recent DEXA was 1/2019 and showed osteopenia.        He is on Claritin and Flonase for allergies.         Not currently taking meds for HTN but has used spironolactone in the past.  BP has been well controlled.  No CP, palpitations, SOB.     ROS:     CONSTITUTIONAL:  Negative for fatigue and fever.      EYES:  Negative for blurred vision.      E/N/T:  Positive for diminished hearing ( bilaterally; hearing aids ) and nasal congestion.   Negative for frequent rhinorrhea.      CARDIOVASCULAR:  Negative for chest pain and palpitations.      RESPIRATORY:  Negative for recent cough and dyspnea.      GASTROINTESTINAL:  Negative for abdominal pain, constipation, diarrhea, nausea and vomiting.      MUSCULOSKELETAL:  Negative for arthralgias and myalgias.      NEUROLOGICAL:  Positive for ataxia and weakness.   Negative for paresthesias.          PMH/FMH/SH:     Last Reviewed on 7/24/2019 08:40 AM by Magali Worrell    Past Medical History:             PAST MEDICAL HISTORY         Positive for    Hyperlipidemia;     Positive for    Sleep Apnea;     Positive for    Renal Stones;     " Positive for    Post Polio;         PAST MEDICAL HISTORY             CURRENT MEDICAL PROVIDERS:    Urologist: Dr. meng         PREVENTIVE HEALTH MAINTENANCE             BONE DENSITY: was last done 1/14/2019 with the following abnormality noted-- osteopenia with osteoporosis R trochanter     COLORECTAL CANCER SCREENING: Up to date (colonoscopy q10y; sigmoidoscopy q5y; Cologuard q3y) was last done 4/26/11, Results are in chart; colonoscopy with the following abnormalities noted-- Diverticulosis     DENTAL CLEANING: was last done 2015     EYE EXAM: was last done 2015     Prolia Injection : 1st inj 6/2014, last inj 3/8/19     PSA: was last done 7/20/17 with normal results         PAST MEDICAL HISTORY                 ADVANCED DIRECTIVES: None         Surgical History:         Cholecystectomy: 1984;      Bilateral upper lid blepharoplasty;    Left ankle stabliizaton;         Family History:     Father: CVA     Brother(s): Coronary Artery Disease; Hyperlipidemia         Social History:     Occupation: Artist     Marital Status:      Children: 2 children         Tobacco/Alcohol/Supplements:     Last Reviewed on 7/24/2019 08:40 AM by Magali Worerll    Tobacco: Current Smoker: He currently smokes some days, Cigars.          Substance Abuse History:     Last Reviewed on 7/24/2019 08:40 AM by Magali Worrell        Mental Health History:     Last Reviewed on 7/24/2019 08:40 AM by Magali Worrell        Communicable Diseases (eg STDs):     Last Reviewed on 7/24/2019 08:40 AM by Magali Worrell            Current Problems:     Last Reviewed on 1/21/2019 09:18 AM by Magali Worrell    Personal history of other drug therapy     Postmenopausal osteoporosis     Testosterone deficiency     Fatigue     Osteoporosis, other     Age-related hearing loss     Post-polio syndrome     Hypertension     Sleep related hypoxia     AKASH     Hyperlipidemia     Kidney stone         Immunizations:     Td adult 9/20/2005     zzPrevnar-13  2/23/2016     Fluarix pf 10/1/2014     Pneomovax 10/2/2006     Fluzone High-Dose pf (>=65 yr) 10/18/2016     Fluzone High-Dose pf (>=65 yr) 10/1/2018     Influenza Virus Vaccine, unspecified formulation 10/1/2017     Zostavax (Zoster live) 6/16/2007         Allergies:     Last Reviewed on 1/21/2019 09:18 AM by Magali Worrell      No Known Drug Allergies.         Current Medications:     Last Reviewed on 1/21/2019 09:18 AM by Magali Worrell    Potassium Chloride 10mEq Capsules, Extended Release 1 capsule daily     Prolia 60mg/1ml Injection give SQ q 6months DX :M81.0     Cetirizine HCl 10mg Tablet 1 tab daily     Flonase Allergy Relief 50mcg/1actuation Nasal Spray     citracal 1 bid     Niacin (Nicotinic Acid) 500mg Tablet Take 2 tablet(s) by mouth bid     probiotic 1 po daily     stool softener 1 po daily     Vitamin B12 1,000mcg Tablet 1/2 tab daily     Vitamin D3 1,000IU Tablet 1 tab daily         OBJECTIVE:        Vitals:         Current: 7/24/2019 8:37:35 AM    Ht:  5 ft, 6 in;  Wt: 155.4 lbs;  BMI: 25.1    T: 97.5 F (oral);  BP: 137/54 mm Hg (left arm, sitting);  P: 54 bpm (left arm (BP Cuff), sitting);  sCr: 0.82 mg/dL;  GFR: 60.29        Exams:     PHYSICAL EXAM:     GENERAL: Vitals recorded well developed, well nourished;  well groomed;  no apparent distress;     EYES: extraocular movements intact; conjunctiva and cornea are normal; PERRLA;     E/N/T: OROPHARYNX: posterior pharynx shows no exudate and cobblestoning;     RESPIRATORY: normal respiratory rate and pattern with no distress; normal breath sounds with no rales, rhonchi, wheezes or rubs;     CARDIOVASCULAR: normal rate; rhythm is regular;  no systolic murmur; no edema;     GASTROINTESTINAL: nontender; normal bowel sounds;     MUSCULOSKELETAL: gait: uses a cane;  normal overall tone brace on RLE;         ASSESSMENT           138   G14  Post-polio syndrome              DDx:     733.09   M81.8  Osteoporosis, other              DDx:     780.57    G47.33  AKASH              DDx:         ORDERS:         Radiology/Test Orders:       3017F  Colorectal CA screen results documented and reviewed (PV)  (In-House)           Lab Orders:       APPTO  Appointment need  (In-House)           Procedures Ordered:       REFER  Referral to Specialist or Other Facility  (Send-Out)                   PLAN:          Post-polio syndrome Doing okay although his balance continues to slowly worsen.  He is interested in trying another round of PT; he prefers to go to [facility] where his wife has been treated.  Otherwise, continue to stay as active as possible.  Keep areas well lit.  We discussed the 3 inputs into the balance system, as he has noticed already that he has a lot more trouble when he is in a dark room.  RTC 6 months for AWV.         REFERRALS:  Referral initiated to physical therapy ( [facility]; for evaluation of postpolio syndrome, balance issues ).  MIPS Smoking cessation encouraged. Counseling for less than 3 minutes.      FOLLOW-UP: Schedule a follow-up visit in 6 months..  Medicare wellness 30 min with [place]           Orders:       3017F  Colorectal CA screen results documented and reviewed (PV)  (In-House)         APPTO  Appointment need  (In-House)         REFER  Referral to Specialist or Other Facility  (Send-Out)            Osteoporosis, other Stable on Prolia.  UTD on DEXA, 1/2019.          AKASH Compliant with CPAP.             Patient Recommendations:        For  Post-polio syndrome:     Schedule a follow-up visit in 6 months.                APPOINTMENT INFORMATION:        Monday Tuesday Wednesday Thursday Friday Saturday Sunday            Time:___________________AM  PM   Date:_____________________             CHARGE CAPTURE           **Please note: ICD descriptions below are intended for billing purposes only and may not represent clinical diagnoses**        Primary Diagnosis:         138 Post-polio syndrome            G14    Postpolio syndrome               Orders:          11218   Office/outpatient visit; established patient, level 4  (In-House)             3017F   Colorectal CA screen results documented and reviewed (PV)  (In-House)             APPTO   Appointment need  (In-House)           733.09 Osteoporosis, other            M81.8    Other osteoporosis without current pathological fracture    780.57 AKASH            G47.33    Obstructive sleep apnea (adult) (pediatric)

## 2021-05-18 NOTE — PROGRESS NOTES
Anirudh Lomeli. 1935     Office/Outpatient Visit    Visit Date: Mon, Jan 21, 2019 08:51 am    Provider: Magali Worrell MD (Assistant: Nasrin Yost MA)    Location: [facility]        Electronically signed by Magali Worrell MD on  01/21/2019 10:45:05 AM                             SUBJECTIVE:        CC:     Mr. Lomeli is a 83 year old White male.  This is a follow-up visit.  Medicare Wellness         HPI:     He is UTD on colonoscopy, last done 4/2011 and that showed diverticulosis.  Further colonoscopy is not indicated.  Prostate cancer screening is no longer indicated.  He is UTD on DEXA, last done 1/2019 and this showed osteopenia with osteoporosis of the R trochanter.  He is on Prolia.  He is UTD on Pneumovax (10/2006), Prevnar (2/2016), Zostavax (6/2007), and flu (10/2018).  He is due for routine labs including CMP.         He had a scare a couple of weeks ago in which he developed a pustule on his penis.  He called the dermatologist and had it looked at.  She told him it wasn't cancer but thought it was a little skin infection.  She gave him an antibiotic and a steroid to use.  It is improving now.     Mr. Lomeli is here for a Medicare wellness visit.  ADVANCED DIRECTIVES: None     Returning to health checkup, the required HRA questions are integrated within this visit note. Family medical history and individual medical/surgical history were reviewed and updated.  A current height, weight, BMI, blood pressure, and pulse were recorded in the vitals section of the note and have been reviewed. Patient's medications, including supplements, were recorded in the chart and reviewed.  Current providers and suppliers were reviewed and updated.          Self-Assessment of Health: He rates his health as good. He rates his confidence of being able to control/manage most of his health problems as very confident. His physical/emotional health has limited his social activites moderately.  A  review of possible cognitive impairment was performed and the following was noted: he is having difficulty driving;  memory changes are noted;  he is not having trouble with his finances A review of functional ability, including bathing, dressing, walking, and urine/bowel continence as well as level of safety was performed and was found to be negative.  Falls Risk: Has not had any falls or only one fall without injury in the past year.  In regard to hearing, he reports wearing hearing aid(s), but not having trouble hearing the TV/radio when others do not or having to strain to hear or understand conversations.  Concerning home safety, he reports that at home he DOES have adequate lighting, a skid resistant shower/tub, grab bars in the bath, handrails on stairs and functioning smoke alarms, but not absence of throw rugs.  Physical Activity: He exercises but less than 20 minutes 3 days per week; Type of diet patient normally eats is described as well-balanced with fruits and vegetables Tobacco: Current Smoker: He currently smokes some days, Cigars.  Preventative Health updated today.  Dx with health checkup;         PHQ-9 Depression Screening: Completed form scanned and in chart; Total Score 1 Alcohol Consumption Screening: Completed form scanned and in chart; Total Score 4     ROS:     CONSTITUTIONAL:  Negative for fatigue and fever.      EYES:  Negative for blurred vision.      E/N/T:  Positive for diminished hearing ( bilaterally; hearing aids ).   Negative for nasal congestion or frequent rhinorrhea.      CARDIOVASCULAR:  Negative for chest pain and palpitations.      RESPIRATORY:  Negative for recent cough and dyspnea.      GASTROINTESTINAL:  Negative for abdominal pain, constipation, diarrhea, nausea and vomiting.      GENITOURINARY:  Negative for nocturia and change in urine stream.      MUSCULOSKELETAL:  Negative for arthralgias and myalgias.      INTEGUMENTARY:  Positive for rash (penile per HPI).   Negative  for atypical mole(s).      NEUROLOGICAL:  Positive for ataxia and weakness.   Negative for paresthesias.      PSYCHIATRIC:  Negative for anxiety, depression and sleep disturbance.          PMH/FMH/SH:     Last Reviewed on 1/21/2019 09:17 AM by Magali Worrell    Past Medical History:             PAST MEDICAL HISTORY         Positive for    Hyperlipidemia;     Positive for    Sleep Apnea;     Positive for    Renal Stones;     Positive for    Post Polio;         PAST MEDICAL HISTORY             CURRENT MEDICAL PROVIDERS:    Urologist: Dr. meng         PREVENTIVE HEALTH MAINTENANCE             BONE DENSITY: was last done 1/14/2019 with the following abnormality noted-- osteopenia with osteoporosis R trochanter     COLORECTAL CANCER SCREENING: Up to date (colonoscopy q10y; sigmoidoscopy q5y; Cologuard q3y) was last done 4/26/11, Results are in chart; colonoscopy with the following abnormalities noted-- Diverticulosis     DENTAL CLEANING: was last done 2015     EYE EXAM: was last done 2015     Prolia Injection : 1st inj 6/2014, last inj 9/7/18     PSA: was last done 7/20/17 with normal results         PAST MEDICAL HISTORY                 ADVANCED DIRECTIVES: None         Surgical History:         Cholecystectomy: 1984;      Bilateral upper lid blepharoplasty;    Left ankle stabliizaton;         Family History:     Father: CVA     Brother(s): Coronary Artery Disease; Hyperlipidemia         Social History:     Occupation: Artist     Marital Status:      Children: 2 children         Tobacco/Alcohol/Supplements:     Last Reviewed on 1/21/2019 09:17 AM by Magali Worrell    Tobacco: Current Smoker: He currently smokes some days, Cigars.          Substance Abuse History:     Last Reviewed on 1/21/2019 09:17 AM by Magali Worrell        Mental Health History:     Last Reviewed on 1/21/2019 09:17 AM by Magali Worrell        Communicable Diseases (eg STDs):     Last Reviewed on 1/21/2019 09:17 AM by Magali Worrell             Current Problems:     Last Reviewed on 7/19/2018 09:20 AM by Magali Worrell    Personal history of other drug therapy     Postmenopausal osteoporosis     Testosterone deficiency     Fatigue     Osteoporosis, other     Age-related hearing loss     Post-polio syndrome     Hypertension     Sleep related hypoxia     AKASH     Hyperlipidemia     Kidney stone         Immunizations:     Td adult 9/20/2005     zzPrevnar-13 2/23/2016     Fluarix pf 10/1/2014     Pneomovax 10/2/2006     Fluzone High-Dose pf (>=65 yr) 10/18/2016     Fluzone High-Dose pf (>=65 yr) 10/1/2018     Influenza Virus Vaccine, unspecified formulation 10/1/2017     Zostavax (Zoster live) 6/16/2007         Allergies:     Last Reviewed on 7/19/2018 09:20 AM by Magali Worrell      No Known Drug Allergies.         Current Medications:     Last Reviewed on 7/19/2018 09:20 AM by Magali Worrell    Potassium Chloride 10mEq Capsules, Extended Release 1 capsule daily     Cetirizine HCl 10mg Tablet 1 tab daily     Flonase Allergy Relief 50mcg/1actuation Nasal Spray     citracal 1 bid     Niacin (Nicotinic Acid) 500mg Tablet Take 2 tablet(s) by mouth bid     probiotic 1 po daily     stool softener 1 po daily     Vitamin B12 1,000mcg Tablet 1/2 tab daily     Vitamin D3 1,000IU Tablet 1 tab daily         OBJECTIVE:        Vitals:         Current: 1/21/2019 9:01:56 AM    Ht:  5 ft, 6 in;  Wt: 155.4 lbs;  BMI: 25.1    T: 97.5 F (oral);  BP: 141/55 mm Hg (right arm, sitting);  P: 56 bpm (right arm (BP Cuff), sitting);  sCr: 0.93 mg/dL;  GFR: 53.16    VA: 20/40 OD, 20/200 OS (near, without correction)        Repeat:     9:15:27 AM     BP:   137/50mm Hg (right arm, sitting)         Exams:     PHYSICAL EXAM:     GENERAL: Vitals recorded well developed, well nourished;  well groomed;  no apparent distress;     EYES: extraocular movements intact; conjunctiva and cornea are normal; PERRLA;     E/N/T: OROPHARYNX: posterior pharynx shows no exudate and cobblestoning;      RESPIRATORY: normal respiratory rate and pattern with no distress; normal breath sounds with no rales, rhonchi, wheezes or rubs;     CARDIOVASCULAR: normal rate; rhythm is regular;  no systolic murmur; no edema;     GASTROINTESTINAL: nontender; normal bowel sounds;     MUSCULOSKELETAL: gait: uses a cane;  normal overall tone brace on RLE;     NEUROLOGIC: mental status: alert and oriented x 3; reflexes: knee jerks: 2+;     PSYCHIATRIC: appropriate affect and demeanor; normal psychomotor function; normal speech pattern;         ASSESSMENT           V70.0   Z00.00  Health checkup              DDx:     V79.0   Z13.89  Screening for depression              DDx:     401.1   I10  Hypertension              DDx:         ORDERS:         Radiology/Test Orders:       3017F  Colorectal CA screen results documented and reviewed (PV)  (In-House)           Lab Orders:       [id number]  COMP - [facility] Comp. Metabolic Panel  (Send-Out)         APPTO  Appointment need  (In-House)           Procedures Ordered:         Annual wellness visit, includes a PPPS, subsequent visit  (In-House)           Other Orders:         Depression screen negative  (In-House)         1101F  Pt screen for fall risk; document no falls in past year or only 1 fall w/o injury in past year (MARY)  (In-House)         4004F  Pt scrnd tobacco use rcvd tobacco cessation talk  (In-House)           Negative EtOH screen  (In-House)           Calculated BMI above the upper parameter and a follow-up plan was documented in the medical record  (In-House)                   PLAN:          Health checkup He is UTD on colonoscopy, last done 4/2011 and that showed diverticulosis.  Further colonoscopy is not indicated.  Prostate cancer screening is no longer indicated.  He is UTD on DEXA, last done 1/2019 and this showed osteopenia with osteoporosis of the R trochanter.  He is on Prolia.  He is UTD on Pneumovax (10/2006), Prevnar (2/2016), Zostavax (6/2007), and  flu (10/2018).  He is due for Shingrix, Havrix and Td; can be done at pharmacy and [facility].  He is due for routine labs including CMP; ordered. No fall risk, no memory issues, no signs/symptoms of depression.  He lives with his wife.  He is able to drive and perform ADLs/manages finances independently.   Hearing is adequate.  He has a living will and preventive services handout and safety handout were given to him.  Current doctor list updated.   RTC 6 months.     MIPS PHQ-9 Depression Screening Completed form scanned and in chart; Total Score 1   Smoking cessation encouraged. Counseling for less than 3 minutes.  Positive alcohol screen discussed minimal use, no concerns at this time.      COLORECTAL CANCER SCREENING: Results are in chart     BMI Elevated - Follow-Up Plan: He was provided education on weight loss strategies     FOLLOW-UP: Schedule a follow-up visit in 6 months..  f/u post-polio syndrome           Orders:         Annual wellness visit, includes a PPPS, subsequent visit  (In-House)           Depression screen negative  (In-House)         1101F  Pt screen for fall risk; document no falls in past year or only 1 fall w/o injury in past year (MARY)  (In-House)         4004F  Pt scrnd tobacco use rcvd tobacco cessation talk  (In-House)           Negative EtOH screen  (In-House)         3017F  Colorectal CA screen results documented and reviewed (PV)  (In-House)           Calculated BMI above the upper parameter and a follow-up plan was documented in the medical record  (In-House)         APPTO  Appointment need  (In-House)             Patient Education Handouts:       Physical Exam 65+ year, Male.1           Hypertension     [facility]  [facility]  CMP           Orders:       [id number]  COMP - [facility] Comp. Metabolic Panel  (Send-Out)               Patient Recommendations:        For  Health checkup:     Schedule a follow-up visit in 6 months.                 APPOINTMENT INFORMATION:        Monday Tuesday Wednesday Thursday Friday Saturday Sunday            Time:___________________AM  PM   Date:_____________________             CHARGE CAPTURE           **Please note: ICD descriptions below are intended for billing purposes only and may not represent clinical diagnoses**        Primary Diagnosis:         V70.0 Health checkup            Z00.00    Encounter for general adult medical examination without abnormal findings              Orders:             Annual wellness visit, includes a PPPS, subsequent visit  (In-House)                Depression screen negative  (In-House)             1101F   Pt screen for fall risk; document no falls in past year or only 1 fall w/o injury in past year (MARY)  (In-House)             4004F   Pt scrnd tobacco use rcvd tobacco cessation talk  (In-House)                Negative EtOH screen  (In-House)             3017F   Colorectal CA screen results documented and reviewed (PV)  (In-House)                Calculated BMI above the upper parameter and a follow-up plan was documented in the medical record  (In-House)             APPTO   Appointment need  (In-House)           V79.0 Screening for depression            Z13.89    Encounter for screening for other disorder    401.1 Hypertension            I10    Essential (primary) hypertension

## 2021-05-18 NOTE — PROGRESS NOTES
Anirudh Lomeli  1935     Office/Outpatient Visit    Visit Date: Wed, Jan 22, 2020 08:42 am    Provider: Magali Worrell MD (Assistant: Tara Fall MA)    Location: [facility]        Electronically signed by Magali Worrell MD on  01/22/2020 11:47:30 AM                             Subjective:        CC: PT STATES HE ISNT TAKING VITAMIN B12    HPI:       He is UTD on colonoscopy, last done 4/2011 and this showed diverticulosis.  Prostate cancer screening no longer indicated by age and history.  HE is UTD on Pneumovax (10/2006), Prevnar (2/2016), Zostavax (6/2007), and flu (10/2019).  He is due for Shingrix and Td.  He is due for routine labs including CMP.    Mr. Lomeli is here for a Medicare wellness visit.          Self-Assessment of Health: He rates his health as very good. He rates his confidence of being able to control/manage most of his health problems as somewhat confident. His physical/emotional health has limited his social activites slightly.  A review of possible cognitive impairment was performed and the following was noted: he is having difficulty driving;  memory changes are noted;  he is not having trouble with his finances A review of functional ability, including bathing, dressing, walking, and urine/bowel continence as well as level of safety was performed and was found to be negative.  Falls Risk: Has not had any falls or only one fall without injury in the past year.  In regard to hearing, he reports having trouble hearing the TV/radio when others do not, having to strain to hear or understand conversations and wearing hearing aid(s).  Concerning home safety, he reports that at home he DOES have adequate lighting, a skid resistant shower/tub, grab bars in the bath, handrails on stairs and functioning smoke alarms, but not absence of throw rugs.  Physical Activity: He exercises for at least 20 minutes 3 or more days/week.; Type of diet patient normally eats is described  as well-balanced with fruits and vegetables Tobacco: Current Smoker: He currently smokes some days, Cigars.  Preventative Health updated today.            PHQ-9 Depression Screening: Completed form scanned and in chart; Total Score 5     ROS:     CONSTITUTIONAL:  Negative for fatigue and fever.      EYES:  Negative for blurred vision.      E/N/T:  Positive for diminished hearing ( bilaterally; hearing aids ) and nasal congestion.   Negative for frequent rhinorrhea.      CARDIOVASCULAR:  Negative for chest pain and palpitations.      RESPIRATORY:  Negative for recent cough and dyspnea.      GASTROINTESTINAL:  Negative for abdominal pain, constipation, diarrhea, nausea and vomiting.      GENITOURINARY:  Negative for nocturia and change in urine stream.      MUSCULOSKELETAL:  Negative for arthralgias and myalgias.      NEUROLOGICAL:  Positive for ataxia and weakness.   Negative for paresthesias.      PSYCHIATRIC:  Negative for anxiety, depression and sleep disturbance.          Past Medical History / Family History / Social History:         Last Reviewed on 1/22/2020 08:59 AM by Magali Worrell    Past Medical History:             PAST MEDICAL HISTORY         Positive for    Hyperlipidemia;     Positive for    Sleep Apnea;     Positive for    Renal Stones;     Positive for    Post Polio;         PAST MEDICAL HISTORY             CURRENT MEDICAL PROVIDERS:    Urologist: Dr. meng         PREVENTIVE HEALTH MAINTENANCE             BONE DENSITY: was last done 1/14/2019 with the following abnormality noted-- osteopenia with osteoporosis R trochanter     COLORECTAL CANCER SCREENING: Up to date (colonoscopy q10y; sigmoidoscopy q5y; Cologuard q3y) was last done 4/26/11, Results are in chart; colonoscopy with the following abnormalities noted-- Diverticulosis     DENTAL CLEANING: was last done 2015     EYE EXAM: was last done 2015     Prolia Injection : 1st inj 6/2014, last inj 9/11/19     PSA: was last done 7/20/17 with  normal results         PAST MEDICAL HISTORY                 ADVANCED DIRECTIVES: None         Surgical History:         Cholecystectomy: 1984;     Bilateral upper lid blepharoplasty;    Left ankle stabliizaton;         Family History:     Father: CVA     Brother(s): Coronary Artery Disease; Hyperlipidemia         Social History:     Occupation: Artist     Marital Status:      Children: 2 children         Tobacco/Alcohol/Supplements:     Last Reviewed on 1/22/2020 08:59 AM by Magali Worrell    Tobacco: Current Smoker: He currently smokes some days, Cigars.          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, bilateral    Osteoporosis without current pathological fracture, other    Other fatigue    Testicular hypofunction    Personal history of other drug therapy    Mixed hyperlipidemia    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        Current Medications:     Last Reviewed on 7/24/2019 08:40 AM by Magali Worrell    Vitamin B-12 1,000 mcg oral tablet [1/2 tab  daily]    Vitamin D3 25 mcg (1,000 unit) oral tablet [1 tab daily]    probiotic 1 po daily     stool softener 1 po daily     niacin 500 mg oral tablet [Take 2 tablet(s) by mouth bid]    cetirizine 10 mg oral tablet [1 tab daily]    Potassium Chloride 10 mEq oral capsule, extended release [1 capsule daily]    citracal 1 bid     Flonase Allergy Relief 50 mcg/actuation Intranasal Spray, Suspension    Prolia 60 mg/mL subcutaneous Syringe [give SQ q 6months DX :M81.0]        Objective:        Vitals:         Current: 1/22/2020 8:51:10 AM    Ht:  5 ft, 6 in;  Wt: 162.8 lbs;  BMI: 26.3T: 97.6 F (oral);  BP: 152/62 mm Hg (right arm, sitting);  P: 56 bpm (right arm (BP Cuff), sitting);  sCr: 0.9 mg/dL;  GFR: 55.09VA: 20/40 OD, 20/70 OS (with correction)        Repeat:     9:23:28 AM  BP:   136/53mm Hg (right arm, sitting, P-50)     Exams:     PHYSICAL EXAM:     GENERAL: Vitals recorded well developed, well nourished;  well groomed;  no apparent distress;     EYES: extraocular movements intact; conjunctiva and cornea are normal; PERRLA;     E/N/T: OROPHARYNX: posterior pharynx shows no exudate and cobblestoning;     RESPIRATORY: normal respiratory rate and pattern with no distress; normal breath sounds with no rales, rhonchi, wheezes or rubs;     CARDIOVASCULAR: normal rate; rhythm is regular;  no systolic murmur; no edema;     GASTROINTESTINAL: nontender; normal bowel sounds;     MUSCULOSKELETAL: gait: uses a cane;  normal overall tone brace on RLE;     NEUROLOGIC: mental status: alert and oriented x 3; reflexes: knee jerks: 2+;     PSYCHIATRIC: appropriate affect and demeanor; normal psychomotor function; normal speech pattern;         Assessment:         Z00.00   Encounter for general adult medical examination without abnormal findings       Z13.31   Encounter for screening for depression       I10   HTN - Essential (primary) hypertension           ORDERS:         Radiology/Test Orders:       3017F  Colorectal CA screen  results documented and reviewed (PV)  (In-House)              Lab Orders:       APPTO  Appointment need  (In-House)            29163  COMP - H Comp. Metabolic Panel  (Send-Out)              Procedures Ordered:         Annual wellness visit, includes a PPPS, subsequent visit  (In-House)              Other Orders:         Depression screen negative  (In-House)            1101F  Pt screen for fall risk; document no falls in past year or only 1 fall w/o injury in past year (MARY)  (In-House)            1124F  Advance Care Planning discussed and doc in MR; no surrogate named or advance care plan provided  (Send-Out)                      Plan:         Encounter for general adult medical examination without abnormal findingsHe is UTD on colonoscopy, last done 4/2011 and this showed diverticulosis.  Prostate cancer screening no longer indicated by age and history.  HE is UTD on Pneumovax (10/2006), Prevnar (2/2016), Zostavax (6/2007), and flu (10/2019).  He is due for Shingrix and Td.  He is due for routine labs including CMP; ordered.  No fall risk, no memory issues, no signs/symptoms of depression.  He lives with his wife.  He is able to drive and perform ADLs/manages finances independently.   Hearing is adequate.  He does not have a living will.  Preventive services handout and safety handout were given to him.  Current doctor list updated.  RTC 6 months.    MIPS PHQ-9 Depression Screening: Completed form scanned and in chart; Total Score 5; Positive Depression Screen but after further evaluation the patient does not have a diagnosis of depression.   Smoking cessation encouraged. Counseling for less than 3 minutes.  ADVANCED DIRECTIVES: None         FOLLOW-UP: Schedule a follow-up visit in 6 months.:.  f/u osteoporosis with [place]          Orders:         Depression screen negative  (In-House)            1101F  Pt screen for fall risk; document no falls in past year or only 1 fall w/o injury in past year  (MARY)  (In-House)            3017F  Colorectal CA screen results documented and reviewed (PV)  (In-House)            1124F  Advance Care Planning discussed and doc in MR; no surrogate named or advance care plan provided  (Send-Out)            APPTO  Appointment need  (In-House)              Annual wellness visit, includes a PPPS, subsequent visit  (In-House)              HTN - Essential (primary) hypertension    LABORATORY:  Labs ordered to be performed today include Comprehensive metabolic panel.            Orders:       [id number]  COMP - [facility] Comp. Metabolic Panel  (Send-Out)                  Patient Recommendations:        For  Encounter for general adult medical examination without abnormal findings:    Schedule a follow-up visit in 6 months.                APPOINTMENT INFORMATION:        Monday Tuesday Wednesday Thursday Friday Saturday Sunday            Time:___________________AM  PM   Date:_____________________             Charge Capture:         Primary Diagnosis:     Z00.00  Encounter for general adult medical examination without abnormal findings           Orders:        Depression screen negative  (In-House)            1101F  Pt screen for fall risk; document no falls in past year or only 1 fall w/o injury in past year (MARY)  (In-House)            3017F  Colorectal CA screen results documented and reviewed (PV)  (In-House)            APPTO  Appointment need  (In-House)              Annual wellness visit, includes a PPPS, subsequent visit  (In-House)              Z13.31  Encounter for screening for depression     I10  HTN - Essential (primary) hypertension

## 2021-05-26 ENCOUNTER — OFFICE VISIT CONVERTED (OUTPATIENT)
Dept: FAMILY MEDICINE CLINIC | Age: 86
End: 2021-05-26
Attending: FAMILY MEDICINE

## 2021-06-05 NOTE — PROGRESS NOTES
"Anirudh Lomeli  1935     Office/Outpatient Visit    Visit Date: Wed, May 26, 2021 08:21 am    Provider: Magali Worrell MD (Assistant: Nena Dominguez RN)    Location: [facility]        Electronically signed by Magali Worrell MD on  05/26/2021 09:43:54 AM                             Subjective:        CC: Mr. Lomeli is a 85 year old White male.  Follow up visit         HPI: Anirudh is here for routine f/u on chronic issues.  It has been over a year since he was seen by me due to the pandemic.  He has had both of his COVID vaccines!        He is on Prolia injections for osteoporosis.  Most recent DEXA was 2/2021 and showed osteopenia with osteoporosis of the R trochanter (improved).        He has post-polio syndrome for which he has been to therapy at [facility].  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  His balance  has been slowly worsening.  He had been going to the gym, but when the pandemic shut everything down, he had to stop going, and it really set him back.  When the gyms finally opened back up, \"I could hardly walk.\"  He ended up getting one of the personal trainers at [employer] to work with him.  That has really, really helped him.        He is on Claritin and Flonase for allergies.         Not currently on antihypertensives but has used spironolactone in the past.  BP has been well controlled.  No CP, palpitations, or SOB.         He has AKASH, using his CPAP regularly.    ROS:     CONSTITUTIONAL:  Negative for fatigue and fever.      E/N/T:  Positive for diminished hearing ( bilaterally; hearing aids ) and nasal congestion.   Negative for frequent rhinorrhea.      CARDIOVASCULAR:  Negative for chest pain and palpitations.      RESPIRATORY:  Negative for recent cough and dyspnea.      GASTROINTESTINAL:  Negative for abdominal pain, constipation, diarrhea, nausea and vomiting.      MUSCULOSKELETAL:  Positive for back pain ( low right back pain ) " and foot pain.   Negative for arthralgias or myalgias.      NEUROLOGICAL:  Positive for ataxia and weakness.   Negative for paresthesias.          Past Medical History / Family History / Social History:         Last Reviewed on 5/26/2021 08:33 AM by Magali Worrell    Past Medical History:             PAST MEDICAL HISTORY         Positive for    Hyperlipidemia;     Positive for    Sleep Apnea;     Positive for    Renal Stones;     Positive for    Post Polio;         PAST MEDICAL HISTORY             CURRENT MEDICAL PROVIDERS:    Urologist: Dr. meng         PREVENTIVE HEALTH MAINTENANCE             BONE DENSITY: was last done 2/5/2021 with the following abnormality noted-- osteopenia with osteoporosis R trochanter; improved     COLORECTAL CANCER SCREENING: Up to date (colonoscopy q10y; sigmoidoscopy q5y; Cologuard q3y) was last done 4/26/11, Results are in chart; colonoscopy with the following abnormalities noted-- Diverticulosis     DENTAL CLEANING: was last done 2015     EYE EXAM: was last done 2015     Prolia Injection : 1st inj 6/2014, last inj 3/16/2021     PSA: was last done 7/20/17 with normal results         PAST MEDICAL HISTORY                 ADVANCED DIRECTIVES: None         Surgical History:         Cholecystectomy: 1984;     Bilateral upper lid blepharoplasty;    Left ankle stabliizaton;         Family History:     Father: CVA     Brother(s): Coronary Artery Disease; Hyperlipidemia         Social History:     Occupation: Artist     Marital Status:      Children: 2 children         Tobacco/Alcohol/Supplements:     Last Reviewed on 5/26/2021 08:33 AM by Magali Worrell    Tobacco: Current Smoker: He currently smokes some days, Cigars.          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, bilateral    Osteoporosis without current pathological fracture, other    Other fatigue    Testicular hypofunction    Personal history of other drug therapy    Mixed hyperlipidemia    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    Unspecified hemorrhoids    Other osteoporosis without current pathological fracture    Postmenopausal osteoporosis    Personal history of other drug therapy    Low back pain    Cyst of pancreas    Encounter for immunization    Age-related osteoporosis without current pathological fracture        Immunizations:     influenza, high-dose, quadrivalent (FLUZONE HIGH-DOSE QUAD 2020-21) 9/14/2020    Td adult 9/20/2005    zzPrevnar-13 2/23/2016    Fluarix pf 10/1/2014    Pneomovax 10/2/2006    Fluzone High-Dose pf (>=65 yr) 10/18/2016    Fluzone High-Dose pf (>=65 yr) 10/1/2018    Fluzone High-Dose pf (>=65 yr) 10/4/2019    Influenza Virus Vaccine, unspecified formulation 10/1/2017    Zostavax (Zoster live) 6/16/2007        Allergies:     Last Reviewed on 8/10/2020 01:38 PM by Donaldo Silva    No Known Allergies.        Current Medications:     Last Reviewed on 8/10/2020 01:38 PM by Donaldo Silva    potassium chloride 10 mEq oral capsule, extended release [TAKE 1 CAPSULE DAILY]    cetirizine 10 mg oral tablet [1 tab daily]    niacin 500 mg oral tablet [Take 2 tablet(s) by mouth bid]    probiotic 1 po daily     Vitamin D3 25 mcg (1,000 unit) oral tablet [1 tab daily]    citracal 1 bid     Flonase Allergy Relief 50 mcg/actuation Intranasal Spray, Suspension    Prolia 60 mg/mL subcutaneous Syringe [give SQ q 6months DX :M81.0]        Objective:        Vitals:         Current: 5/26/2021 8:28:06 AM     Ht:  5 ft, 6 in;  Wt: 159.4 lbs;  BMI: 25.7T: 97.3 F (temporal);  BP: 145/74 mm Hg (left arm, sitting);  P: 56 bpm (left arm (BP Cuff), sitting);  sCr: 0.87 mg/dL;  GFR: 55.51        Repeat:     9:7:13 AM  BP:   150/55mm Hg (left arm, sitting, p-50)     Exams:     PHYSICAL EXAM:     GENERAL: Vitals recorded well developed, well nourished;  well groomed;  no apparent distress;     EYES: extraocular movements intact; conjunctiva and cornea are normal; PERRLA;     RESPIRATORY: normal respiratory rate and pattern with no distress; normal breath sounds with no rales, rhonchi, wheezes or rubs;     CARDIOVASCULAR: normal rate; rhythm is regular;  no systolic murmur; no edema;     GASTROINTESTINAL: nontender; normal bowel sounds;     MUSCULOSKELETAL: gait: uses a cane;  normal overall tone brace on RLE;         Assessment:         M81.0   Age-related osteoporosis without current pathological fracture       G14   Postpolio syndrome       I10   HTN - Essential (primary) hypertension       G47.33   Obstructive sleep apnea (adult) (pediatric)           ORDERS:         Lab Orders:       APPTO  Appointment need  (In-House)              Other Orders:       1124F  Advance Care Planning discussed and doc in MR; no surrogate named or advance care plan provided  (Send-Out)            1101F  Pt screen for fall risk; document no falls in past year or only 1 fall w/o injury in past year (MARY)  (In-House)                      Plan:         Age-related osteoporosis without current pathological fractureStable on Prolia.  Labs reviewed and UTD.  DEXA from 2/2021 reviewed today; showed improvement.  Currently shows osteopenia with osteoporosis in the trochanter only.  RTC 6 months for AWV.        No falls in the past year.    MIPS Has had no falls or only one fall without injury in the past year Vaccines Flu and Pneumonia updated in Shot record Advance Directive/Surrogate Decision Maker discussed and pt declines to complete today  Smoking  "cessation encouraged. Counseling for less than 3 minutes.      FOLLOW-UP: Schedule a follow-up visit in 6 months.:.  Medicare wellness 30 min with Gm          Orders:       1124F  Advance Care Planning discussed and doc in MR; no surrogate named or advance care plan provided  (Send-Out)            APPTO  Appointment need  (In-House)            1101F  Pt screen for fall risk; document no falls in past year or only 1 fall w/o injury in past year (MARY)  (In-House)              Postpolio syndromeLost some ground during the pandemic, but he is back to working out with a  at [employer], and that has \"worked better than any physical therapist I've ever worked with.\"  He feels like he is getting his strength back.  He is having some foot pain and plans to ask the podiatrist the next time he goes to see him. He does still have problems with balance and ambulating for long periods.  He needs to get a walker with a seat to help with ambulation outside the home.        HTN - Essential (primary) hypertensionNot currently on antihypertensives.  Labs reviewed and UTD.        Obstructive sleep apnea (adult) (pediatric)Uses his CPAP regularly.            Patient Recommendations:        For  Age-related osteoporosis without current pathological fracture:    Schedule a follow-up visit in 6 months.                APPOINTMENT INFORMATION:        Monday Tuesday Wednesday Thursday Friday Saturday Sunday            Time:___________________AM  PM   Date:_____________________             Charge Capture:         Primary Diagnosis:     M81.0  Age-related osteoporosis without current pathological fracture           Orders:      14789  Office/outpatient visit; established patient, level 4  (In-House)            APPTO  Appointment need  (In-House)            1101F  Pt screen for fall risk; document no falls in past year or only 1 fall w/o injury in past year (MARY)  (In-House)              G14  Postpolio syndrome    "  I10  HTN - Essential (primary) hypertension     G47.33  Obstructive sleep apnea (adult) (pediatric)

## 2021-07-01 VITALS
SYSTOLIC BLOOD PRESSURE: 137 MMHG | TEMPERATURE: 97.5 F | WEIGHT: 155.4 LBS | BODY MASS INDEX: 24.98 KG/M2 | HEIGHT: 66 IN | HEART RATE: 56 BPM | DIASTOLIC BLOOD PRESSURE: 50 MMHG

## 2021-07-01 VITALS
SYSTOLIC BLOOD PRESSURE: 125 MMHG | WEIGHT: 156.8 LBS | HEART RATE: 69 BPM | HEIGHT: 66 IN | DIASTOLIC BLOOD PRESSURE: 70 MMHG | BODY MASS INDEX: 25.2 KG/M2 | TEMPERATURE: 97.1 F

## 2021-07-01 VITALS
BODY MASS INDEX: 24.98 KG/M2 | HEART RATE: 54 BPM | DIASTOLIC BLOOD PRESSURE: 64 MMHG | HEIGHT: 66 IN | SYSTOLIC BLOOD PRESSURE: 129 MMHG | TEMPERATURE: 97.4 F | WEIGHT: 155.4 LBS

## 2021-07-01 VITALS
HEART RATE: 54 BPM | HEIGHT: 66 IN | DIASTOLIC BLOOD PRESSURE: 54 MMHG | TEMPERATURE: 97.5 F | SYSTOLIC BLOOD PRESSURE: 137 MMHG | WEIGHT: 155.4 LBS | BODY MASS INDEX: 24.98 KG/M2

## 2021-07-02 VITALS
SYSTOLIC BLOOD PRESSURE: 136 MMHG | HEART RATE: 56 BPM | TEMPERATURE: 97.6 F | BODY MASS INDEX: 26.16 KG/M2 | WEIGHT: 162.8 LBS | HEIGHT: 66 IN | DIASTOLIC BLOOD PRESSURE: 53 MMHG

## 2021-07-02 VITALS
WEIGHT: 157 LBS | TEMPERATURE: 97.5 F | DIASTOLIC BLOOD PRESSURE: 81 MMHG | HEIGHT: 66 IN | BODY MASS INDEX: 25.23 KG/M2 | SYSTOLIC BLOOD PRESSURE: 117 MMHG | HEART RATE: 90 BPM

## 2021-07-02 VITALS
HEIGHT: 66 IN | WEIGHT: 159.4 LBS | BODY MASS INDEX: 25.62 KG/M2 | SYSTOLIC BLOOD PRESSURE: 150 MMHG | TEMPERATURE: 97.3 F | HEART RATE: 56 BPM | DIASTOLIC BLOOD PRESSURE: 55 MMHG

## 2021-07-02 VITALS
DIASTOLIC BLOOD PRESSURE: 68 MMHG | SYSTOLIC BLOOD PRESSURE: 156 MMHG | TEMPERATURE: 97.8 F | WEIGHT: 158.4 LBS | BODY MASS INDEX: 25.46 KG/M2 | HEART RATE: 64 BPM | HEIGHT: 66 IN

## 2021-09-17 ENCOUNTER — TELEPHONE (OUTPATIENT)
Dept: FAMILY MEDICINE CLINIC | Age: 86
End: 2021-09-17

## 2021-09-20 ENCOUNTER — CLINICAL SUPPORT (OUTPATIENT)
Dept: FAMILY MEDICINE CLINIC | Age: 86
End: 2021-09-20

## 2021-09-20 DIAGNOSIS — M81.0 AGE-RELATED OSTEOPOROSIS WITHOUT CURRENT PATHOLOGICAL FRACTURE: Primary | ICD-10-CM

## 2021-09-20 PROCEDURE — 96372 THER/PROPH/DIAG INJ SC/IM: CPT | Performed by: FAMILY MEDICINE

## 2021-09-29 ENCOUNTER — CLINICAL SUPPORT (OUTPATIENT)
Dept: FAMILY MEDICINE CLINIC | Age: 86
End: 2021-09-29

## 2021-09-29 DIAGNOSIS — Z23 NEED FOR INFLUENZA VACCINATION: Primary | ICD-10-CM

## 2021-09-29 PROCEDURE — 90662 IIV NO PRSV INCREASED AG IM: CPT | Performed by: FAMILY MEDICINE

## 2021-09-29 PROCEDURE — G0008 ADMIN INFLUENZA VIRUS VAC: HCPCS | Performed by: FAMILY MEDICINE

## 2021-10-06 RX ORDER — POTASSIUM CHLORIDE 750 MG/1
CAPSULE, EXTENDED RELEASE ORAL
Qty: 90 CAPSULE | Refills: 0 | Status: SHIPPED | OUTPATIENT
Start: 2021-10-06 | End: 2021-12-07

## 2021-12-03 ENCOUNTER — OFFICE VISIT (OUTPATIENT)
Dept: FAMILY MEDICINE CLINIC | Age: 86
End: 2021-12-03

## 2021-12-03 ENCOUNTER — LAB (OUTPATIENT)
Dept: LAB | Facility: HOSPITAL | Age: 86
End: 2021-12-03

## 2021-12-03 VITALS
HEIGHT: 66 IN | SYSTOLIC BLOOD PRESSURE: 139 MMHG | BODY MASS INDEX: 25.65 KG/M2 | DIASTOLIC BLOOD PRESSURE: 69 MMHG | WEIGHT: 159.6 LBS | HEART RATE: 55 BPM

## 2021-12-03 DIAGNOSIS — G47.33 OBSTRUCTIVE SLEEP APNEA: ICD-10-CM

## 2021-12-03 DIAGNOSIS — I10 PRIMARY HYPERTENSION: ICD-10-CM

## 2021-12-03 DIAGNOSIS — Z00.00 ANNUAL PHYSICAL EXAM: Primary | ICD-10-CM

## 2021-12-03 DIAGNOSIS — M81.0 AGE-RELATED OSTEOPOROSIS WITHOUT CURRENT PATHOLOGICAL FRACTURE: ICD-10-CM

## 2021-12-03 PROBLEM — G47.34 HYPOXIA, SLEEP RELATED: Status: ACTIVE | Noted: 2021-12-03

## 2021-12-03 PROBLEM — H91.13 PRESBYCUSIS OF BOTH EARS: Status: ACTIVE | Noted: 2021-12-03

## 2021-12-03 PROBLEM — G89.29 CHRONIC LOW BACK PAIN: Status: ACTIVE | Noted: 2021-12-03

## 2021-12-03 PROBLEM — G14 POSTPOLIO SYNDROME: Status: ACTIVE | Noted: 2021-12-03

## 2021-12-03 PROBLEM — M54.50 CHRONIC LOW BACK PAIN: Status: ACTIVE | Noted: 2021-12-03

## 2021-12-03 PROBLEM — K86.2 CYST OF PANCREAS: Status: ACTIVE | Noted: 2021-12-03

## 2021-12-03 LAB
ALBUMIN SERPL-MCNC: 4.1 G/DL (ref 3.5–5.2)
ALBUMIN/GLOB SERPL: 1.7 G/DL
ALP SERPL-CCNC: 74 U/L (ref 39–117)
ALT SERPL W P-5'-P-CCNC: 19 U/L (ref 1–41)
ANION GAP SERPL CALCULATED.3IONS-SCNC: 3.4 MMOL/L (ref 5–15)
AST SERPL-CCNC: 28 U/L (ref 1–40)
BILIRUB SERPL-MCNC: 0.4 MG/DL (ref 0–1.2)
BUN SERPL-MCNC: 15 MG/DL (ref 8–23)
BUN/CREAT SERPL: 17.9 (ref 7–25)
CALCIUM SPEC-SCNC: 9.7 MG/DL (ref 8.6–10.5)
CHLORIDE SERPL-SCNC: 101 MMOL/L (ref 98–107)
CO2 SERPL-SCNC: 31.6 MMOL/L (ref 22–29)
CREAT SERPL-MCNC: 0.84 MG/DL (ref 0.76–1.27)
GFR SERPL CREATININE-BSD FRML MDRD: 87 ML/MIN/1.73
GLOBULIN UR ELPH-MCNC: 2.4 GM/DL
GLUCOSE SERPL-MCNC: 91 MG/DL (ref 65–99)
POTASSIUM SERPL-SCNC: 4.1 MMOL/L (ref 3.5–5.2)
PROT SERPL-MCNC: 6.5 G/DL (ref 6–8.5)
SODIUM SERPL-SCNC: 136 MMOL/L (ref 136–145)

## 2021-12-03 PROCEDURE — 1126F AMNT PAIN NOTED NONE PRSNT: CPT | Performed by: FAMILY MEDICINE

## 2021-12-03 PROCEDURE — 80053 COMPREHEN METABOLIC PANEL: CPT

## 2021-12-03 PROCEDURE — G0439 PPPS, SUBSEQ VISIT: HCPCS | Performed by: FAMILY MEDICINE

## 2021-12-03 PROCEDURE — 1159F MED LIST DOCD IN RCRD: CPT | Performed by: FAMILY MEDICINE

## 2021-12-03 PROCEDURE — 1170F FXNL STATUS ASSESSED: CPT | Performed by: FAMILY MEDICINE

## 2021-12-03 PROCEDURE — 36415 COLL VENOUS BLD VENIPUNCTURE: CPT

## 2021-12-03 RX ORDER — NIACIN 500 MG
500 TABLET ORAL
COMMUNITY

## 2021-12-03 RX ORDER — SPIRONOLACTONE 25 MG/1
12.5 TABLET ORAL DAILY
COMMUNITY
End: 2021-12-03

## 2021-12-03 RX ORDER — PSEUDOEPHEDRINE HCL 30 MG
100 TABLET ORAL
COMMUNITY
End: 2022-12-06

## 2021-12-03 RX ORDER — MELATONIN
1000 DAILY
COMMUNITY

## 2021-12-03 NOTE — PROGRESS NOTES
The ABCs of the Annual Wellness Visit  Subsequent Medicare Wellness Visit    Chief Complaint   Patient presents with   • Medicare Wellness-subsequent     Yearly Medicare Exam      Subjective    History of Present Illness:  Anirudh Lomeli is a 86 y.o. male who presents for a Subsequent Medicare Wellness Visit.    He is UTD on colonoscopy, last done 4/2011 and this showed diverticulosis.    Colonoscopy no longer indicated by age and history.  Prostate cancer screening no longer indicated by age and history.  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  He is on Prolia.  He is UTD on Covid (Moderna x3), Pneumovax (10/2006), Prevnar (2/2016), Zostavax (6/2007), and flu (9/2021).  He is due for Shingrix and Td.  He is due for routine labs including CMP.    He might need an ear washout today.      He does have AKASH and is on CPAP.  He was affected by the recall.      The following portions of the patient's history were reviewed and   updated as appropriate: allergies, current medications, past family history, past medical history, past social history, past surgical history and problem list.    Compared to one year ago, the patient feels his physical   health is worse.    Compared to one year ago, the patient feels his mental   health is worse.    Recent Hospitalizations:  He was not admitted to the hospital during the last year.       Current Medical Providers:  Patient Care Team:  Magali Worrell MD as PCP - General (Family Medicine)    Outpatient Medications Prior to Visit   Medication Sig Dispense Refill   • cholecalciferol (Cholecalciferol) 25 MCG (1000 UT) tablet Take 1,000 Units by mouth Daily.     • docusate sodium 100 MG capsule Take 100 mg by mouth.     • niacin 500 MG tablet Take 500 mg by mouth.     • potassium chloride (MICRO-K) 10 MEQ CR capsule TAKE 1 CAPSULE EVERY DAY 90 capsule 0   • Probiotic Product (PROBIOTIC-10 PO) Take 1 capsule by mouth Daily.     • Cetirizine HCl 10 MG  capsule Take 10 mg by mouth Daily.     • spironolactone (ALDACTONE) 25 MG tablet Take 12.5 mg by mouth Daily.       Facility-Administered Medications Prior to Visit   Medication Dose Route Frequency Provider Last Rate Last Admin   • denosumab (PROLIA) syringe 60 mg  60 mg Subcutaneous Q6 Months Magali Worrell MD   60 mg at 09/20/21 0916       No opioid medication identified on active medication list. I have reviewed chart for other potential  high risk medication/s and harmful drug interactions in the elderly.          Aspirin is not on active medication list.  Aspirin use is not indicated based on review of current medical condition/s. Risk of harm outweighs potential benefits.  .    Patient Active Problem List   Diagnosis   • Obstructive sleep apnea   • Primary hypertension   • Postpolio syndrome   • Presbycusis of both ears   • Hypoxia, sleep related   • Cyst of pancreas   • Age-related osteoporosis without current pathological fracture   • Chronic low back pain   • Annual physical exam     Advance Care Planning  Advance Directive is not on file.  ACP discussion was held with the patient during this visit. Patient does not have an advance directive, information provided.    Review of Systems   Constitutional: Negative for chills, fatigue and fever.   HENT: Positive for hearing loss. Negative for congestion and rhinorrhea.    Eyes: Negative for pain and visual disturbance.   Respiratory: Negative for cough and shortness of breath.    Cardiovascular: Negative for chest pain and palpitations.   Gastrointestinal: Negative for abdominal pain, constipation, diarrhea, nausea and vomiting.   Genitourinary: Negative for difficulty urinating and dysuria.   Musculoskeletal: Positive for arthralgias. Negative for myalgias.   Neurological: Positive for weakness. Negative for numbness.        +imbalance due to postpolio   Psychiatric/Behavioral: Negative for dysphoric mood and sleep disturbance. The patient is not  "nervous/anxious.         Objective    Vitals:    12/03/21 0932   BP: 139/69   BP Location: Right arm   Patient Position: Sitting   Cuff Size: Adult   Pulse: 55   Weight: 72.4 kg (159 lb 9.6 oz)   Height: 167.6 cm (66\")   PainSc: 0-No pain     BMI Readings from Last 1 Encounters:   12/03/21 25.76 kg/m²   BMI is above normal parameters. Recommendations include: exercise counseling and nutrition counseling    Does the patient have evidence of cognitive impairment? No    Physical Exam  Vitals reviewed.   Constitutional:       General: He is not in acute distress.     Appearance: Normal appearance. He is well-developed.   HENT:      Head: Normocephalic and atraumatic.      Right Ear: External ear normal.      Left Ear: External ear normal.      Mouth/Throat:      Mouth: Mucous membranes are moist.   Eyes:      Extraocular Movements: Extraocular movements intact.      Conjunctiva/sclera: Conjunctivae normal.      Pupils: Pupils are equal, round, and reactive to light.   Cardiovascular:      Rate and Rhythm: Normal rate and regular rhythm.      Heart sounds: No murmur heard.      Pulmonary:      Effort: Pulmonary effort is normal.      Breath sounds: Normal breath sounds. No wheezing, rhonchi or rales.   Abdominal:      General: Bowel sounds are normal. There is no distension.      Palpations: Abdomen is soft.      Tenderness: There is no abdominal tenderness.   Musculoskeletal:         General: Normal range of motion.   Skin:     General: Skin is warm and dry.   Neurological:      Mental Status: He is alert and oriented to person, place, and time.      Deep Tendon Reflexes: Reflexes normal.   Psychiatric:         Mood and Affect: Mood and affect normal.         Behavior: Behavior normal.         Thought Content: Thought content normal.         Judgment: Judgment normal.                 HEALTH RISK ASSESSMENT    Smoking Status:  Social History     Tobacco Use   Smoking Status Never Smoker   Smokeless Tobacco Never Used "     Alcohol Consumption:  Social History     Substance and Sexual Activity   Alcohol Use Yes     Fall Risk Screen:    STEADI Fall Risk Assessment was completed, and patient is at MODERATE risk for falls. Assessment completed on:12/3/2021    Depression Screening:  PHQ-2/PHQ-9 Depression Screening 12/3/2021   Little interest or pleasure in doing things 0   Feeling down, depressed, or hopeless 1   Total Score 1       Health Habits and Functional and Cognitive Screening:  Functional & Cognitive Status 12/3/2021   Do you have difficulty preparing food and eating? No   Do you have difficulty bathing yourself, getting dressed or grooming yourself? No   Do you have difficulty using the toilet? Yes   Do you have difficulty moving around from place to place? Yes   Do you have trouble with steps or getting out of a bed or a chair? No   Current Diet Well Balanced Diet   Dental Exam Up to date   Eye Exam Up to date   Exercise (times per week) 5 times per week   Current Exercises Include Other   Do you need help using the phone?  Yes   Are you deaf or do you have serious difficulty hearing?  Yes   Do you need help with transportation? No   Do you need help shopping? No   Do you need help preparing meals?  No   Do you need help with housework?  No   Do you need help with laundry? No   Do you need help taking your medications? No   Do you need help managing money? No   Do you ever drive or ride in a car without wearing a seat belt? Yes   Have you felt unusual stress, anger or loneliness in the last month? Yes   Who do you live with? Spouse   If you need help, do you have trouble finding someone available to you? No   Do you have difficulty concentrating, remembering or making decisions? Yes       Age-appropriate Screening Schedule:  Refer to the list below for future screening recommendations based on patient's age, sex and/or medical conditions. Orders for these recommended tests are listed in the plan section. The patient has  been provided with a written plan.    Health Maintenance   Topic Date Due   • TDAP/TD VACCINES (1 - Tdap) Never done   • ZOSTER VACCINE (1 of 2) Never done   • DXA SCAN  02/05/2023   • INFLUENZA VACCINE  Completed   • LIPID PANEL  Discontinued              Assessment/Plan   CMS Preventative Services Quick Reference  Risk Factors Identified During Encounter  Immunizations Discussed/Encouraged (specific Immunizations; Td, Shingrix and COVID19  The above risks/problems have been discussed with the patient.  Follow up actions/plans if indicated are seen below in the Assessment/Plan Section.  Pertinent information has been shared with the patient in the After Visit Summary.    Diagnoses and all orders for this visit:    1. Annual physical exam (Primary)  Assessment & Plan:  He is UTD on colonoscopy, last done 4/2011 and this showed diverticulosis.    Colonoscopy no longer indicated by age and history.  Prostate cancer screening no longer indicated by age and history.  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  He is on Prolia.  He is UTD on Covid (Moderna x3), Pneumovax (10/2006), Prevnar (2/2016), Zostavax (6/2007), and flu (9/2021).  He is due for Shingrix and Td; can be done at the pharmacy.  He is due for routine labs including CMP; ordered.      2. Primary hypertension  -     Comprehensive Metabolic Panel; Future    3. Obstructive sleep apnea  Assessment & Plan:  Tolerates the CPAP.      4. Age-related osteoporosis without current pathological fracture  Assessment & Plan:  On Prolia.        Follow Up:   Return in about 6 months (around 6/3/2022) for Recheck.     An After Visit Summary and PPPS were made available to the patient.

## 2021-12-03 NOTE — ASSESSMENT & PLAN NOTE
He is UTD on colonoscopy, last done 4/2011 and this showed diverticulosis.    Colonoscopy no longer indicated by age and history.  Prostate cancer screening no longer indicated by age and history.  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  He is on Prolia.  He is UTD on Covid (Moderna x3), Pneumovax (10/2006), Prevnar (2/2016), Zostavax (6/2007), and flu (9/2021).  He is due for Shingrix and Td; can be done at the pharmacy.  He is due for routine labs including CMP; ordered.

## 2021-12-07 RX ORDER — POTASSIUM CHLORIDE 750 MG/1
CAPSULE, EXTENDED RELEASE ORAL
Qty: 90 CAPSULE | Refills: 1 | Status: SHIPPED | OUTPATIENT
Start: 2021-12-07 | End: 2022-04-19

## 2022-03-22 ENCOUNTER — CLINICAL SUPPORT (OUTPATIENT)
Dept: FAMILY MEDICINE CLINIC | Age: 87
End: 2022-03-22

## 2022-03-22 DIAGNOSIS — M81.0 AGE-RELATED OSTEOPOROSIS WITHOUT CURRENT PATHOLOGICAL FRACTURE: ICD-10-CM

## 2022-03-22 PROCEDURE — 96372 THER/PROPH/DIAG INJ SC/IM: CPT | Performed by: FAMILY MEDICINE

## 2022-04-19 RX ORDER — POTASSIUM CHLORIDE 750 MG/1
CAPSULE, EXTENDED RELEASE ORAL
Qty: 90 CAPSULE | Refills: 1 | Status: SHIPPED | OUTPATIENT
Start: 2022-04-19 | End: 2022-10-03

## 2022-04-22 ENCOUNTER — OFFICE VISIT (OUTPATIENT)
Dept: FAMILY MEDICINE CLINIC | Age: 87
End: 2022-04-22

## 2022-04-22 VITALS
DIASTOLIC BLOOD PRESSURE: 53 MMHG | HEART RATE: 55 BPM | BODY MASS INDEX: 25.71 KG/M2 | WEIGHT: 160 LBS | HEIGHT: 66 IN | SYSTOLIC BLOOD PRESSURE: 149 MMHG

## 2022-04-22 DIAGNOSIS — R42 DIZZINESS: Primary | ICD-10-CM

## 2022-04-22 PROCEDURE — 99213 OFFICE O/P EST LOW 20 MIN: CPT | Performed by: NURSE PRACTITIONER

## 2022-04-22 RX ORDER — MECLIZINE HYDROCHLORIDE 25 MG/1
25 TABLET ORAL 3 TIMES DAILY PRN
Qty: 30 TABLET | Refills: 0 | Status: SHIPPED | OUTPATIENT
Start: 2022-04-22

## 2022-04-22 NOTE — PROGRESS NOTES
"Chief Complaint  Dizziness    Subjective          Anirudh Lomeli presents to [facility]  His last episode of vertigo was years ago.  He reports he had an episode of vertigo on Wednesday, which has resolved, but he still have lingering feeling of imbalance and unsteady gait.    Dizziness  This is a new problem. The current episode started in the past 7 days. The problem has been gradually improving. Associated symptoms include nausea, vertigo, a visual change and vomiting. Pertinent negatives include no chills, coughing, headaches or weakness. Nothing aggravates the symptoms. Treatments tried: Meclizine. The treatment provided mild relief.       Objective   Vital Signs:   /53 (BP Location: Left arm, Patient Position: Sitting)   Pulse 55   Ht 167.6 cm (66\")   Wt 72.6 kg (160 lb)   BMI 25.82 kg/m²     Physical Exam  Constitutional:       General: He is not in acute distress.     Appearance: Normal appearance. He is normal weight.   HENT:      Head: Normocephalic.      Right Ear: Tympanic membrane, ear canal and external ear normal.      Left Ear: Tympanic membrane, ear canal and external ear normal.   Eyes:      Pupils: Pupils are equal, round, and reactive to light.      Visual Fields: Right eye visual fields normal and left eye visual fields normal.   Neck:      Trachea: Trachea normal.   Cardiovascular:      Rate and Rhythm: Normal rate and regular rhythm.      Heart sounds: Normal heart sounds.   Pulmonary:      Effort: Pulmonary effort is normal.      Breath sounds: Normal breath sounds and air entry.   Musculoskeletal:      Right lower leg: No edema.      Left lower leg: No edema.   Skin:     General: Skin is warm and dry.   Neurological:      Mental Status: He is alert and oriented to person, place, and time.      Comments: Negative [place]-Hallpike   Psychiatric:         Mood and Affect: Mood and affect normal.         Behavior: Behavior normal.         Thought Content: " Thought content normal.        Result Review :   The following data was reviewed by: ASHTYN Jones on 04/22/2022:                  Assessment and Plan    Diagnoses and all orders for this visit:    1. Dizziness (Primary)  -     meclizine (ANTIVERT) 25 MG tablet; Take 1 tablet by mouth 3 (Three) Times a Day As Needed for Dizziness.  Dispense: 30 tablet; Refill: 0  -     CT Head With & Without Contrast; Future    He did have vertigo, but resolved.  He report he is having unstable gait and feeling unbalanced.  His lingering symptoms may not be due to vertigo.  His [place]-Hallpike was negative.  We will send in meclizine.  We will also order CT of his head.      Follow Up   Return for Next scheduled follow up.  Patient was given instructions and counseling regarding his condition or for health maintenance advice. Please see specific information pulled into the AVS if appropriate.

## 2022-04-28 ENCOUNTER — HOSPITAL ENCOUNTER (OUTPATIENT)
Dept: CT IMAGING | Facility: HOSPITAL | Age: 87
Discharge: HOME | End: 2022-04-28
Admitting: NURSE PRACTITIONER

## 2022-04-28 DIAGNOSIS — R42 DIZZINESS: ICD-10-CM

## 2022-04-28 LAB
CREAT BLDA-MCNC: 0.8 MG/DL
EGFRCR SERPLBLD CKD-EPI 2021: 86.2 ML/MIN/1.73

## 2022-04-28 PROCEDURE — 70470 CT HEAD/BRAIN W/O & W/DYE: CPT

## 2022-04-28 PROCEDURE — 82565 ASSAY OF CREATININE: CPT

## 2022-04-28 PROCEDURE — 0 IOPAMIDOL PER 1 ML: Performed by: NURSE PRACTITIONER

## 2022-04-28 RX ADMIN — IOPAMIDOL 50 ML: 755 INJECTION, SOLUTION INTRAVENOUS at 15:42

## 2022-04-29 DIAGNOSIS — I63.81 LACUNAR INFARCTION: Primary | ICD-10-CM

## 2022-06-03 ENCOUNTER — OFFICE VISIT (OUTPATIENT)
Dept: FAMILY MEDICINE CLINIC | Age: 87
End: 2022-06-03

## 2022-06-03 ENCOUNTER — LAB (OUTPATIENT)
Dept: LAB | Facility: HOSPITAL | Age: 87
End: 2022-06-03

## 2022-06-03 VITALS
BODY MASS INDEX: 26.15 KG/M2 | WEIGHT: 162 LBS | DIASTOLIC BLOOD PRESSURE: 60 MMHG | SYSTOLIC BLOOD PRESSURE: 155 MMHG | HEART RATE: 51 BPM

## 2022-06-03 DIAGNOSIS — R35.1 BENIGN PROSTATIC HYPERPLASIA WITH NOCTURIA: ICD-10-CM

## 2022-06-03 DIAGNOSIS — G47.33 OBSTRUCTIVE SLEEP APNEA: ICD-10-CM

## 2022-06-03 DIAGNOSIS — N40.1 BENIGN PROSTATIC HYPERPLASIA WITH NOCTURIA: ICD-10-CM

## 2022-06-03 DIAGNOSIS — I10 PRIMARY HYPERTENSION: ICD-10-CM

## 2022-06-03 DIAGNOSIS — M81.0 AGE-RELATED OSTEOPOROSIS WITHOUT CURRENT PATHOLOGICAL FRACTURE: Primary | ICD-10-CM

## 2022-06-03 DIAGNOSIS — G14 POSTPOLIO SYNDROME: ICD-10-CM

## 2022-06-03 DIAGNOSIS — M81.0 AGE-RELATED OSTEOPOROSIS WITHOUT CURRENT PATHOLOGICAL FRACTURE: ICD-10-CM

## 2022-06-03 DIAGNOSIS — I63.81 LACUNAR INFARCTION: ICD-10-CM

## 2022-06-03 PROBLEM — Z00.00 ANNUAL PHYSICAL EXAM: Status: RESOLVED | Noted: 2021-12-03 | Resolved: 2022-06-03

## 2022-06-03 LAB
ALBUMIN SERPL-MCNC: 3.9 G/DL (ref 3.5–5.2)
ALBUMIN/GLOB SERPL: 2 G/DL
ALP SERPL-CCNC: 70 U/L (ref 39–117)
ALT SERPL W P-5'-P-CCNC: 16 U/L (ref 1–41)
ANION GAP SERPL CALCULATED.3IONS-SCNC: 10 MMOL/L (ref 5–15)
AST SERPL-CCNC: 19 U/L (ref 1–40)
BILIRUB SERPL-MCNC: 0.3 MG/DL (ref 0–1.2)
BUN SERPL-MCNC: 16 MG/DL (ref 8–23)
BUN/CREAT SERPL: 18.4 (ref 7–25)
CALCIUM SPEC-SCNC: 9.5 MG/DL (ref 8.6–10.5)
CHLORIDE SERPL-SCNC: 105 MMOL/L (ref 98–107)
CO2 SERPL-SCNC: 28 MMOL/L (ref 22–29)
CREAT SERPL-MCNC: 0.87 MG/DL (ref 0.76–1.27)
EGFRCR SERPLBLD CKD-EPI 2021: 84 ML/MIN/1.73
GLOBULIN UR ELPH-MCNC: 2 GM/DL
GLUCOSE SERPL-MCNC: 103 MG/DL (ref 65–99)
POTASSIUM SERPL-SCNC: 4.3 MMOL/L (ref 3.5–5.2)
PROT SERPL-MCNC: 5.9 G/DL (ref 6–8.5)
SODIUM SERPL-SCNC: 143 MMOL/L (ref 136–145)

## 2022-06-03 PROCEDURE — 80053 COMPREHEN METABOLIC PANEL: CPT

## 2022-06-03 PROCEDURE — 99214 OFFICE O/P EST MOD 30 MIN: CPT | Performed by: FAMILY MEDICINE

## 2022-06-03 PROCEDURE — 36415 COLL VENOUS BLD VENIPUNCTURE: CPT

## 2022-06-03 RX ORDER — DOXAZOSIN MESYLATE 1 MG/1
1 TABLET ORAL NIGHTLY
Qty: 30 TABLET | Refills: 5 | Status: SHIPPED | OUTPATIENT
Start: 2022-06-03 | End: 2022-12-06

## 2022-06-03 NOTE — PROGRESS NOTES
Chief Complaint  Hypertension (6 month follow up )    Subjective          Anirudh Lomeli presents to [facility] today for routine f/u of chronic issues.    He is on Prolia injections for osteoporosis. DEXA is UTD, last done 2/2021 and this showed osteopenia with osteoporosis of the R trochanter (improved).    He has an appt with Sarah Lee after finding of a chronic lacunar infarct on head CT.  That is coming up in July.      He has had more and more issues with frequency and nocturia.  This has been a gradually worsening problem.      He has post-polio syndrome.  He uses a brace on the R leg of his own design.  Status post therapy at [facility].  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  Balance continues to slowly deteriorate.  He does try to go to the gym regularly.  He has noticed more problems with pitting edema in the bilateral lower legs.  He has been using compression stockings but they are very difficult to get on.       Has a history of hypertension but has not required antihypertensives in quite some time.  Blood pressure is a bit high today.      He has sleep apnea and uses CPAP regularly.      Current Outpatient Medications:   •  cholecalciferol (VITAMIN D3) 25 MCG (1000 UT) tablet, Take 1,000 Units by mouth Daily., Disp: , Rfl:   •  Cyanocobalamin (B-12 PO), Take  by mouth., Disp: , Rfl:   •  docusate sodium 100 MG capsule, Take 100 mg by mouth., Disp: , Rfl:   •  meclizine (ANTIVERT) 25 MG tablet, Take 1 tablet by mouth 3 (Three) Times a Day As Needed for Dizziness., Disp: 30 tablet, Rfl: 0  •  niacin 500 MG tablet, Take 500 mg by mouth., Disp: , Rfl:   •  potassium chloride (MICRO-K) 10 MEQ CR capsule, TAKE 1 CAPSULE EVERY DAY, Disp: 90 capsule, Rfl: 1  •  Probiotic Product (PROBIOTIC-10 PO), Take 1 capsule by mouth Daily., Disp: , Rfl:   •  doxazosin (Cardura) 1 MG tablet, Take 1 tablet by mouth Every Night., Disp: 30 tablet, Rfl:  5    Current Facility-Administered Medications:   •  denosumab (PROLIA) syringe 60 mg, 60 mg, Subcutaneous, Q6 Months, Magali Worrell MD, 60 mg at 03/22/22 0903    Allergies:  Patient has no known allergies.      Objective   Vital Signs:   Vitals:    06/03/22 1007 06/03/22 1035   BP: 150/55 155/60   BP Location: Left arm Left arm   Patient Position: Sitting Sitting   Pulse: 52 51   Weight: 73.5 kg (162 lb)        Physical Exam  Vitals reviewed.   Constitutional:       General: He is not in acute distress.     Appearance: Normal appearance. He is well-developed.   HENT:      Head: Normocephalic and atraumatic.      Right Ear: External ear normal.      Left Ear: External ear normal.      Mouth/Throat:      Mouth: Mucous membranes are moist.   Eyes:      Extraocular Movements: Extraocular movements intact.      Conjunctiva/sclera: Conjunctivae normal.      Pupils: Pupils are equal, round, and reactive to light.   Cardiovascular:      Rate and Rhythm: Normal rate and regular rhythm.      Heart sounds: No murmur heard.  Pulmonary:      Effort: Pulmonary effort is normal.      Breath sounds: Normal breath sounds. No wheezing, rhonchi or rales.   Abdominal:      General: Bowel sounds are normal. There is no distension.      Palpations: Abdomen is soft.      Tenderness: There is no abdominal tenderness.   Musculoskeletal:         General: Normal range of motion.      Comments: Drop-foot with brace on R leg  Walker for ambulation   Neurological:      Mental Status: He is alert and oriented to person, place, and time.   Psychiatric:         Mood and Affect: Affect normal.             Assessment and Plan    Diagnoses and all orders for this visit:    1. Age-related osteoporosis without current pathological fracture (Primary)  Overview:  On Prolia.  Last DEXA was done 2/2021 and showed osteopenia with osteoporosis of the right trochanter (improved.    Assessment & Plan:  Checking labs.    Orders:  -     Comprehensive  Metabolic Panel; Future    2. Benign prostatic hyperplasia with nocturia  Assessment & Plan:  We will add in some doxazosin 1 mg to be taken nightly to see if this helps with his nocturia and urinary frequency.  Increase as needed.    Orders:  -     doxazosin (Cardura) 1 MG tablet; Take 1 tablet by mouth Every Night.  Dispense: 30 tablet; Refill: 5    3. Postpolio syndrome  Assessment & Plan:  Slowly declining.  He does notice a lot of effects to his balance.  He continues wearing his brace.      4. Obstructive sleep apnea  Overview:  On CPAP.      5. Primary hypertension  Assessment & Plan:  Has not required antihypertensives in some time.  He does need something to help with his nocturia and urinary frequency, so we will try him on some doxazosin which will give him some blood pressure benefits as well.  Checking labs.      6. Lacunar infarction (HCC)  Assessment & Plan:  Has follow-up scheduled with Neurology early next month.        Follow Up   Return in about 6 months (around 12/3/2022) for Medicare Wellness.  Patient was given instructions and counseling regarding his condition or for health maintenance advice. Please see specific information pulled into the AVS if appropriate.

## 2022-06-03 NOTE — ASSESSMENT & PLAN NOTE
Has not required antihypertensives in some time.  He does need something to help with his nocturia and urinary frequency, so we will try him on some doxazosin which will give him some blood pressure benefits as well.  Checking labs.

## 2022-06-03 NOTE — ASSESSMENT & PLAN NOTE
We will add in some doxazosin 1 mg to be taken nightly to see if this helps with his nocturia and urinary frequency.  Increase as needed.

## 2022-06-03 NOTE — ASSESSMENT & PLAN NOTE
Slowly declining.  He does notice a lot of effects to his balance.  He continues wearing his brace.

## 2022-07-26 ENCOUNTER — OFFICE VISIT (OUTPATIENT)
Dept: NEUROLOGY | Facility: CLINIC | Age: 87
End: 2022-07-26

## 2022-07-26 VITALS
SYSTOLIC BLOOD PRESSURE: 142 MMHG | HEIGHT: 66 IN | WEIGHT: 158.9 LBS | HEART RATE: 55 BPM | DIASTOLIC BLOOD PRESSURE: 57 MMHG | BODY MASS INDEX: 25.54 KG/M2

## 2022-07-26 DIAGNOSIS — I63.81 LACUNAR INFARCTION: Primary | ICD-10-CM

## 2022-07-26 DIAGNOSIS — G14 POSTPOLIO SYNDROME: ICD-10-CM

## 2022-07-26 PROCEDURE — 99215 OFFICE O/P EST HI 40 MIN: CPT | Performed by: NURSE PRACTITIONER

## 2022-07-26 RX ORDER — DIAZEPAM 5 MG/1
5 TABLET ORAL DAILY
Qty: 1 TABLET | Refills: 0 | Status: SHIPPED | OUTPATIENT
Start: 2022-07-26 | End: 2022-07-27 | Stop reason: SDUPTHER

## 2022-07-26 RX ORDER — VITAMIN B COMPLEX
CAPSULE ORAL DAILY
COMMUNITY

## 2022-07-26 NOTE — PATIENT INSTRUCTIONS
Stroke Prevention  Some medical conditions and behaviors are associated with a higher chance of having a stroke. You can help prevent a stroke by making nutrition, lifestyle, and other changes, including managing any medical conditions you may have.  What nutrition changes can be made?    Eat healthy foods. You can do this by:  Choosing foods high in fiber, such as fresh fruits and vegetables and whole grains.  Eating at least 5 or more servings of fruits and vegetables a day. Try to fill half of your plate at each meal with fruits and vegetables.  Choosing lean protein foods, such as lean cuts of meat, poultry without skin, fish, tofu, beans, and nuts.  Eating low-fat dairy products.  Avoiding foods that are high in salt (sodium). This can help lower blood pressure.  Avoiding foods that have saturated fat, trans fat, and cholesterol. This can help prevent high cholesterol.  Avoiding processed and premade foods.  Follow your health care provider's specific guidelines for losing weight, controlling high blood pressure (hypertension), lowering high cholesterol, and managing diabetes. These may include:  Reducing your daily calorie intake.  Limiting your daily sodium intake to 1,500 milligrams (mg).  Using only healthy fats for cooking, such as olive oil, canola oil, or sunflower oil.  Counting your daily carbohydrate intake.  What lifestyle changes can be made?  Maintain a healthy weight. Talk to your health care provider about your ideal weight.  Get at least 30 minutes of moderate physical activity at least 5 days a week. Moderate activity includes brisk walking, biking, and swimming.  Do not use any products that contain nicotine or tobacco, such as cigarettes and e-cigarettes. If you need help quitting, ask your health care provider. It may also be helpful to avoid exposure to secondhand smoke.  Limit alcohol intake to no more than 1 drink a day for nonpregnant women and 2 drinks a day for men. One drink equals 12  oz of beer, 5 oz of wine, or 1½ oz of hard liquor.  Stop any illegal drug use.  Avoid taking birth control pills. Talk to your health care provider about the risks of taking birth control pills if:  You are over 35 years old.  You smoke.  You get migraines.  You have ever had a blood clot.  What other changes can be made?  Manage your cholesterol levels.  Eating a healthy diet is important for preventing high cholesterol. If cholesterol cannot be managed through diet alone, you may also need to take medicines.  Take any prescribed medicines to control your cholesterol as told by your health care provider.  Manage your diabetes.  Eating a healthy diet and exercising regularly are important parts of managing your blood sugar. If your blood sugar cannot be managed through diet and exercise, you may need to take medicines.  Take any prescribed medicines to control your diabetes as told by your health care provider.  Control your hypertension.  To reduce your risk of stroke, try to keep your blood pressure below 130/80.  Eating a healthy diet and exercising regularly are an important part of controlling your blood pressure. If your blood pressure cannot be managed through diet and exercise, you may need to take medicines.  Take any prescribed medicines to control hypertension as told by your health care provider.  Ask your health care provider if you should monitor your blood pressure at home.  Have your blood pressure checked every year, even if your blood pressure is normal. Blood pressure increases with age and some medical conditions.  Get evaluated for sleep disorders (sleep apnea). Talk to your health care provider about getting a sleep evaluation if you snore a lot or have excessive sleepiness.  Take over-the-counter and prescription medicines only as told by your health care provider. Aspirin or blood thinners (antiplatelets or anticoagulants) may be recommended to reduce your risk of forming blood clots that can  lead to stroke.  Make sure that any other medical conditions you have, such as atrial fibrillation or atherosclerosis, are managed.  What are the warning signs of a stroke?  The warning signs of a stroke can be easily remembered as BEFAST.  B is for balance. Signs include:  Dizziness.  Loss of balance or coordination.  Sudden trouble walking.  E is for eyes. Signs include:  A sudden change in vision.  Trouble seeing.  F is for face. Signs include:  Sudden weakness or numbness of the face.  The face or eyelid drooping to one side.  A is for arms. Signs include:  Sudden weakness or numbness of the arm, usually on one side of the body.  S is for speech. Signs include:  Trouble speaking (aphasia).  Trouble understanding.  T is for time.  These symptoms may represent a serious problem that is an emergency. Do not wait to see if the symptoms will go away. Get medical help right away. Call your local emergency services (911 in the [place] Do not drive yourself to the hospital.  Other signs of stroke may include:  A sudden, severe headache with no known cause.  Nausea or vomiting.  Seizure.  Where to find more information  For more information, visit:  American Stroke Association: www.strokeassociation.org  National Stroke Association: www.stroke.org  Summary  You can prevent a stroke by eating healthy, exercising, not smoking, limiting alcohol intake, and managing any medical conditions you may have.  Do not use any products that contain nicotine or tobacco, such as cigarettes and e-cigarettes. If you need help quitting, ask your health care provider. It may also be helpful to avoid exposure to secondhand smoke.  Remember BEFAST for warning signs of stroke. Get help right away if you or a loved one has any of these signs.  This information is not intended to replace advice given to you by your health care provider. Make sure you discuss any questions you have with your health care provider.  Document Revised: 11/30/2018  Document Reviewed: 01/23/2018  Elsevier Patient Education © 2021 [employer]

## 2022-07-26 NOTE — PROGRESS NOTES
"Chief Complaint  Neurologic Problem and Gait Problem    Subjective          Anirudh Lomeli presents to [facility]  States he had onset of dizziness back in April.  Describes dizziness as a spinning sensation.  States that he felt like he was walking on uneven ground or would have to hold onto the wall when he was walking.  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  States that he goes to the gym 5 times a week to keep strength in right leg.  States that dizziness has slowly improved since April.  Was referred to neurology due to abnormal CT head imaging.      Objective   Vital Signs:   /57   Pulse 55   Ht 167.6 cm (66\")   Wt 72.1 kg (158 lb 14.4 oz)   BMI 25.65 kg/m²     Physical Exam  Neurological:      Mental Status: He is oriented to person, place, and time.      Deep Tendon Reflexes:      Reflex Scores:       Bicep reflexes are 2+ on the right side and 2+ on the left side.       Brachioradialis reflexes are 2+ on the right side and 2+ on the left side.       Patellar reflexes are 0 on the right side and 0 on the left side.       Neurologic Exam     Mental Status   Oriented to person, place, and time.     Cranial Nerves   Cranial nerves II through XII intact.     Motor Exam     Strength   Right deltoid: 5/5  Left deltoid: 5/5  Right biceps: 5/5  Left biceps: 5/5  Right triceps: 5/5  Left triceps: 5/5  Right iliopsoas: 4/5  Left iliopsoas: 5/5  Right anterior tibial: 4/5  Left anterior tibial: 5/5  Right posterior tibial: 4/5  Left posterior tibial: 5/5    Gait, Coordination, and Reflexes     Reflexes   Right brachioradialis: 2+  Left brachioradialis: 2+  Right biceps: 2+  Left biceps: 2+  Right patellar: 0  Left patellar: 0Ambulates with cane.  Limping gait from right sided weakness.         Result Review :   CBC:  Lab Results   Component Value Date    WBC 5.24 08/10/2020    RBC 4.34 (L) 08/10/2020    HGB 15.00 08/10/2020    HCT 42.2 " 08/10/2020    MCV 97.2 (H) 08/10/2020    MCH 34.6 (H) 08/10/2020    MCHC 35.5 08/10/2020    RDW 13.5 08/10/2020    .00 08/10/2020     CMP:  Lab Results   Component Value Date    BUN 16 06/03/2022    CREATININE 0.87 06/03/2022    EGFRIFNONA 87 12/03/2021    EGFRIFAFRI 88 07/20/2018     06/03/2022    K 4.3 06/03/2022     06/03/2022    CALCIUM 9.5 06/03/2022    ALBUMIN 3.90 06/03/2022    BILITOT 0.3 06/03/2022    ALKPHOS 70 06/03/2022    AST 19 06/03/2022    ALT 16 06/03/2022     LIPID PANEL:  Lab Results   Component Value Date    TRIG 56 07/27/2022    HDL 75 (H) 07/27/2022    VLDL 12 07/27/2022    LDL 68 07/27/2022    LDLHDL 0.92 07/27/2022             CT Head:   1. Chronic left lentiform nucleus lacunar infarct    Assessment and Plan    Diagnoses and all orders for this visit:    1. Lacunar infarction (HCC) (Primary)  Assessment & Plan:  Will order MRI brain and MRA head and neck for further evaluation of lacunar infarct on CT head and to ensure no vertebrobasilar insufficiency causing vertigo.  Discussed signs and symptoms of stroke including, but not limited to, visual disturbances, weakness of one side of the body, facial droop, cognitive changes, slurred speech, imbalance and dizziness.  Instructed patient to call 911 and get emergent medical treatment immediately at the onset of those symptoms.   Patient verbalized understanding.     Orders:  -     MRI Brain With & Without Contrast; Future  -     MRI Angiogram Head Without Contrast; Future  -     MRI Angiogram Neck Without Contrast; Future  -     Lipid Panel; Future  -     Discontinue: diazePAM (VALIUM) 5 MG tablet; Take 1 tablet by mouth Daily. 40 mins prior to MRI study  Dispense: 1 tablet; Refill: 0    2. Postpolio syndrome    I spent 45 minutes caring for Anirudh on this date of service. This time includes time spent by me in the following activities:preparing for the visit, reviewing tests, obtaining and/or reviewing a separately obtained  history, performing a medically appropriate examination and/or evaluation , counseling and educating the patient/family/caregiver, ordering medications, tests, or procedures, documenting information in the medical record and independently interpreting results and communicating that information with the patient/family/caregiver  Follow Up   Return in about 2 months (around 9/26/2022) for stroke f/u.  Patient was given instructions and counseling regarding his condition or for health maintenance advice. Please see specific information pulled into the AVS if appropriate.

## 2022-07-27 ENCOUNTER — LAB (OUTPATIENT)
Dept: LAB | Facility: HOSPITAL | Age: 87
End: 2022-07-27

## 2022-07-27 ENCOUNTER — TELEPHONE (OUTPATIENT)
Dept: NEUROLOGY | Facility: CLINIC | Age: 87
End: 2022-07-27

## 2022-07-27 DIAGNOSIS — I63.81 LACUNAR INFARCTION: ICD-10-CM

## 2022-07-27 LAB
CHOLEST SERPL-MCNC: 155 MG/DL (ref 0–200)
HDLC SERPL-MCNC: 75 MG/DL (ref 40–60)
LDLC SERPL CALC-MCNC: 68 MG/DL (ref 0–100)
LDLC/HDLC SERPL: 0.92 {RATIO}
TRIGL SERPL-MCNC: 56 MG/DL (ref 0–150)
VLDLC SERPL-MCNC: 12 MG/DL (ref 5–40)

## 2022-07-27 PROCEDURE — 80061 LIPID PANEL: CPT

## 2022-07-27 PROCEDURE — 36415 COLL VENOUS BLD VENIPUNCTURE: CPT

## 2022-07-27 RX ORDER — DIAZEPAM 5 MG/1
5 TABLET ORAL DAILY
Qty: 1 TABLET | Refills: 0 | Status: SHIPPED | OUTPATIENT
Start: 2022-07-27 | End: 2022-12-06

## 2022-07-27 NOTE — TELEPHONE ENCOUNTER
Provider: TIFFANIE  Caller: CURT BROWN  Relationship to Patient: WIFE  Pharmacy: [id number]  Phone Number: [phone]  Reason for Call: PATIENT WIFE TEL TO ADVISE THAT THE DIAZEPAM (VALIUM) 5 MG TABLET WAS INADVERTENTLY SENT TO PATIENT MAIL ORDER PHARMACY.    PLEASE RE-SEND THIS RX TO THE ABOVE LISTED PHARMACY.    CALL WITH ANY QUESTIONS.    THANK YOU.

## 2022-07-28 NOTE — ASSESSMENT & PLAN NOTE
Will order MRI brain and MRA head and neck for further evaluation of lacunar infarct on CT head and to ensure no vertebrobasilar insufficiency causing vertigo.  Discussed signs and symptoms of stroke including, but not limited to, visual disturbances, weakness of one side of the body, facial droop, cognitive changes, slurred speech, imbalance and dizziness.  Instructed patient to call 911 and get emergent medical treatment immediately at the onset of those symptoms.   Patient verbalized understanding.

## 2022-08-01 ENCOUNTER — TELEPHONE (OUTPATIENT)
Dept: FAMILY MEDICINE CLINIC | Age: 87
End: 2022-08-01

## 2022-08-01 NOTE — TELEPHONE ENCOUNTER
Pt due for Prolia 09/22/22    Last Dexa -4.8  DEXA Bone Density Axial (02/05/2021 11:24)    Last CMP- Calcium 9.5  Comprehensive Metabolic Panel (06/03/2022 10:47)    Last office Visit   Office Visit with Magali Worrell MD (06/03/2022)

## 2022-08-18 ENCOUNTER — HOSPITAL ENCOUNTER (OUTPATIENT)
Dept: MRI IMAGING | Facility: HOSPITAL | Age: 87
Discharge: HOME | End: 2022-08-18

## 2022-08-18 DIAGNOSIS — I63.81 LACUNAR INFARCTION: ICD-10-CM

## 2022-08-18 LAB
CREAT BLDA-MCNC: 1 MG/DL
EGFRCR SERPLBLD CKD-EPI 2021: 73.3 ML/MIN/1.73

## 2022-08-18 PROCEDURE — 82565 ASSAY OF CREATININE: CPT

## 2022-08-18 PROCEDURE — 0 GADOBENATE DIMEGLUMINE 529 MG/ML SOLUTION: Performed by: NURSE PRACTITIONER

## 2022-08-18 PROCEDURE — A9577 INJ MULTIHANCE: HCPCS | Performed by: NURSE PRACTITIONER

## 2022-08-18 PROCEDURE — 70547 MR ANGIOGRAPHY NECK W/O DYE: CPT

## 2022-08-18 PROCEDURE — 70553 MRI BRAIN STEM W/O & W/DYE: CPT

## 2022-08-18 PROCEDURE — 70544 MR ANGIOGRAPHY HEAD W/O DYE: CPT

## 2022-08-18 RX ADMIN — GADOBENATE DIMEGLUMINE 15 ML: 529 INJECTION, SOLUTION INTRAVENOUS at 14:04

## 2022-09-13 ENCOUNTER — TELEPHONE (OUTPATIENT)
Dept: NEUROLOGY | Facility: CLINIC | Age: 87
End: 2022-09-13

## 2022-09-13 NOTE — TELEPHONE ENCOUNTER
Caller: Adriana Lomeli & PT    Relationship: PT'S WIFE    Best call back number: [phone]    Caller requesting test results: PT'S WIFE & PT    What test was performed: MRI ANGIOGRAM NECK & MRI ANGIOGRAM HEAD & MRI BRAIN W/WO    When was the test performed: 8-18-22    Where was the test performed:   [facility]    Additional notes: PT & PT'S WIFE IS WANTING RESULTS OF THE TESTS. PT IS HAVING A DIFFICULTY WALKING AND STANDING . HE FEELS LIKE HES GOING TO FALL OVER AND HE WALKS WITH A CANE ALL THE TIME.     FYI: PT WANTS TO BE SEEN SOONER THEN THE 10-18-22, IT CAN BE IN EITHER [place] OFFICE OR [facility] OFFICE

## 2022-09-13 NOTE — TELEPHONE ENCOUNTER
LVM to call office    HUB OK to READ:  MRA Head/Neck are normal-Unfortunately, there is not a sooner appt at either location, they are more than welcome to call and check for cancellations.

## 2022-09-13 NOTE — TELEPHONE ENCOUNTER
Caller: MARGARET BROWN    Relationship: Emergency Contact; SPOUSE    Best call back number: [phone]    What was the call regarding: MRA/MRI RESULTS RELAYED VERBATIM TO PT'S WIFE; PT'S WIFE VERBALIZED UNDERSTANDING.    PT'S WIFE ASKS IF ASHTYN COTTO HAS HAD A CHANCE TO LOOK AT THE LIPID & POC CREATININE LAB RESULTS.    Do you require a callback: YES, PLEASE.    PLEASE REVIEW AND ADVISE.

## 2022-09-14 ENCOUNTER — TELEPHONE (OUTPATIENT)
Dept: FAMILY MEDICINE CLINIC | Age: 87
End: 2022-09-14

## 2022-09-14 NOTE — TELEPHONE ENCOUNTER
Noted.  We did receive notification from the dentist office about that.  It looks like he will actually be okay for him to have the Prolia shot in December, as the dentist stated he ought to wait 2 months after the procedure to have that done.  I believe Azeb has already updated the schedule to reflect the change and we will contact him when it is time for him to get that done.  Thanks, CHAYITO

## 2022-09-14 NOTE — TELEPHONE ENCOUNTER
Caller: Anirudh Lomeli    Relationship: Self    Best call back number: [phone]    What is the best time to reach you: NURSE     Who are you requesting to speak with (clinical staff, provider,  specific staff member): NURSE     What was the call regarding: PATIENT IS HAVING A TOOTH REMOVED AND WAS TOLD TO STOP THE PROLIA SHOOT BECAUSE IT WOULD INTERFERE WITH HEALING.  SO HE WILL NOT BE IN FOR September SHOT, BUT WILL NEED TO MAKE AN APPOINTMENT FOR MARCH SHOT.

## 2022-10-03 RX ORDER — POTASSIUM CHLORIDE 750 MG/1
CAPSULE, EXTENDED RELEASE ORAL
Qty: 90 CAPSULE | Refills: 1 | Status: SHIPPED | OUTPATIENT
Start: 2022-10-03

## 2022-10-18 ENCOUNTER — CLINICAL SUPPORT (OUTPATIENT)
Dept: FAMILY MEDICINE CLINIC | Age: 87
End: 2022-10-18

## 2022-10-18 ENCOUNTER — OFFICE VISIT (OUTPATIENT)
Dept: NEUROLOGY | Facility: CLINIC | Age: 87
End: 2022-10-18

## 2022-10-18 VITALS
BODY MASS INDEX: 26.1 KG/M2 | SYSTOLIC BLOOD PRESSURE: 131 MMHG | DIASTOLIC BLOOD PRESSURE: 53 MMHG | WEIGHT: 162.4 LBS | HEIGHT: 66 IN | HEART RATE: 57 BPM

## 2022-10-18 DIAGNOSIS — G14 POSTPOLIO SYNDROME: ICD-10-CM

## 2022-10-18 DIAGNOSIS — Z23 NEED FOR INFLUENZA VACCINATION: Primary | ICD-10-CM

## 2022-10-18 DIAGNOSIS — I63.81 LACUNAR INFARCTION: Primary | ICD-10-CM

## 2022-10-18 PROCEDURE — G0008 ADMIN INFLUENZA VIRUS VAC: HCPCS | Performed by: FAMILY MEDICINE

## 2022-10-18 PROCEDURE — 99214 OFFICE O/P EST MOD 30 MIN: CPT | Performed by: NURSE PRACTITIONER

## 2022-10-18 PROCEDURE — 90662 IIV NO PRSV INCREASED AG IM: CPT | Performed by: FAMILY MEDICINE

## 2022-10-20 NOTE — ASSESSMENT & PLAN NOTE
Mobility decline secondary to postpolio syndrome.  Offered PT, but he is going to the gym near daily on his own.

## 2022-10-20 NOTE — ASSESSMENT & PLAN NOTE
MRI brain confirms lacunar infarct of basal ganglia.  No vascular stenosis noted. LDL 68.  Continue 81mg aspirin.  Discussed signs and symptoms of stroke including, but not limited to, visual disturbances, weakness of one side of the body, facial droop, cognitive changes, slurred speech, imbalance and dizziness.  Instructed patient to call 911 and get emergent medical treatment immediately at the onset of those symptoms.   Patient verbalized understanding.

## 2022-10-20 NOTE — PROGRESS NOTES
"Chief Complaint  Neurologic Problem (MRI/MRA/Lab )    Subjective          Anirudh Lomeli presents to [facility]  History of Present Illness  Following up to discuss MRI brain and MRA head/neck results.  No recurrent stroke-like episode.  States he's having increased difficulty with ambulation secondary to post-polio syndrome.  Is going to the gym regularly.      Interval History:   States he had onset of dizziness back in April.  Describes dizziness as a spinning sensation.  States that he felt like he was walking on uneven ground or would have to hold onto the wall when he was walking.  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  States that he goes to the gym 5 times a week to keep strength in right leg.  States that dizziness has slowly improved since April.  Was referred to neurology due to abnormal CT head imaging.      Objective   Vital Signs:   /53   Pulse 57   Ht 167.6 cm (66\")   Wt 73.7 kg (162 lb 6.4 oz)   BMI 26.21 kg/m²     Physical Exam  Neurological:      Mental Status: He is oriented to person, place, and time.      Cranial Nerves: Cranial nerves 2-12 are intact.      Deep Tendon Reflexes:      Reflex Scores:       Bicep reflexes are 2+ on the right side and 2+ on the left side.       Brachioradialis reflexes are 2+ on the right side and 2+ on the left side.       Patellar reflexes are 0 on the right side and 0 on the left side.       Neurologic Exam     Mental Status   Oriented to person, place, and time.     Cranial Nerves   Cranial nerves II through XII intact.     Motor Exam     Strength   Right deltoid: 5/5  Left deltoid: 5/5  Right biceps: 5/5  Left biceps: 5/5  Right triceps: 5/5  Left triceps: 5/5  Right iliopsoas: 4/5  Left iliopsoas: 5/5  Right anterior tibial: 4/5  Left anterior tibial: 5/5  Right posterior tibial: 4/5  Left posterior tibial: 5/5    Gait, Coordination, and Reflexes     Reflexes   Right " brachioradialis: 2+  Left brachioradialis: 2+  Right biceps: 2+  Left biceps: 2+  Right patellar: 0  Left patellar: 0Ambulates with cane.  Limping gait from right sided weakness.         Result Review :   LIPID PANEL:  Lab Results   Component Value Date    TRIG 56 07/27/2022    HDL 75 (H) 07/27/2022    VLDL 12 07/27/2022    LDL 68 07/27/2022    LDLHDL 0.92 07/27/2022                MRI Brain:   1. Moderate generalized parenchymal volume loss.  2. Old left basal ganglia lacunar infarct  3. No acute intracranial abnormality.  4. No pathologic contrast enhancement.       MRA Head:   1. No intracranial vascular stenosis, occlusion, or aneurysm  2. 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    MRA Neck:   1. No hemodynamically significant stenosis of the visualized portions of the carotid or vertebral   Arteries.    Assessment and Plan    Diagnoses and all orders for this visit:    1. Lacunar infarction (HCC) (Primary)  Assessment & Plan:  MRI brain confirms lacunar infarct of basal ganglia.  No vascular stenosis noted. LDL 68.  Continue 81mg aspirin.  Discussed signs and symptoms of stroke including, but not limited to, visual disturbances, weakness of one side of the body, facial droop, cognitive changes, slurred speech, imbalance and dizziness.  Instructed patient to call 911 and get emergent medical treatment immediately at the onset of those symptoms.   Patient verbalized understanding.       2. Postpolio syndrome  Assessment & Plan:  Mobility decline secondary to postpolio syndrome.  Offered PT, but he is going to the gym near daily on his own.         Follow Up   Return if symptoms worsen or fail to improve.  Patient was given instructions and counseling regarding his condition or for health maintenance advice. Please see specific information pulled into the AVS if appropriate.

## 2022-12-01 ENCOUNTER — TELEPHONE (OUTPATIENT)
Dept: FAMILY MEDICINE CLINIC | Age: 87
End: 2022-12-01

## 2022-12-01 DIAGNOSIS — I10 PRIMARY HYPERTENSION: Primary | ICD-10-CM

## 2022-12-01 NOTE — TELEPHONE ENCOUNTER
Caller: MARGARET BROWN    Relationship: Emergency Contact    Best call back number: [phone]    What orders are you requesting (i.e. lab or imaging): LABS    In what timeframe would the patient need to come in: PATIENT'S SPOUSE IS REQUESTING LABS TO BE PUT IN PRIOR TO HIS APPOINTMENT ON 12/6/22, HE WOULD LIKE TO HAVE THEM DRAWN SO THEY CAN BE DISCUSSED AT VISIT.     Where will you receive your lab/imaging services:[facility]

## 2022-12-05 ENCOUNTER — LAB (OUTPATIENT)
Dept: LAB | Facility: HOSPITAL | Age: 87
End: 2022-12-05

## 2022-12-05 DIAGNOSIS — I10 PRIMARY HYPERTENSION: ICD-10-CM

## 2022-12-05 LAB
ALBUMIN SERPL-MCNC: 3.4 G/DL (ref 3.5–5.2)
ALBUMIN/GLOB SERPL: 1.3 G/DL
ALP SERPL-CCNC: 97 U/L (ref 39–117)
ALT SERPL W P-5'-P-CCNC: 17 U/L (ref 1–41)
ANION GAP SERPL CALCULATED.3IONS-SCNC: 7.7 MMOL/L (ref 5–15)
AST SERPL-CCNC: 24 U/L (ref 1–40)
BILIRUB SERPL-MCNC: 0.4 MG/DL (ref 0–1.2)
BUN SERPL-MCNC: 18 MG/DL (ref 8–23)
BUN/CREAT SERPL: 18.8 (ref 7–25)
CALCIUM SPEC-SCNC: 9.4 MG/DL (ref 8.6–10.5)
CHLORIDE SERPL-SCNC: 106 MMOL/L (ref 98–107)
CO2 SERPL-SCNC: 31.3 MMOL/L (ref 22–29)
CREAT SERPL-MCNC: 0.96 MG/DL (ref 0.76–1.27)
EGFRCR SERPLBLD CKD-EPI 2021: 76.5 ML/MIN/1.73
GLOBULIN UR ELPH-MCNC: 2.6 GM/DL
GLUCOSE SERPL-MCNC: 146 MG/DL (ref 65–99)
POTASSIUM SERPL-SCNC: 4 MMOL/L (ref 3.5–5.2)
PROT SERPL-MCNC: 6 G/DL (ref 6–8.5)
SODIUM SERPL-SCNC: 145 MMOL/L (ref 136–145)

## 2022-12-05 PROCEDURE — 36415 COLL VENOUS BLD VENIPUNCTURE: CPT

## 2022-12-05 PROCEDURE — 80053 COMPREHEN METABOLIC PANEL: CPT

## 2022-12-05 PROCEDURE — 83735 ASSAY OF MAGNESIUM: CPT

## 2022-12-06 ENCOUNTER — OFFICE VISIT (OUTPATIENT)
Dept: FAMILY MEDICINE CLINIC | Age: 87
End: 2022-12-06

## 2022-12-06 VITALS
WEIGHT: 163 LBS | HEART RATE: 54 BPM | DIASTOLIC BLOOD PRESSURE: 73 MMHG | HEIGHT: 66 IN | BODY MASS INDEX: 26.2 KG/M2 | SYSTOLIC BLOOD PRESSURE: 149 MMHG

## 2022-12-06 DIAGNOSIS — I63.81 LACUNAR INFARCTION: ICD-10-CM

## 2022-12-06 DIAGNOSIS — Z00.00 ANNUAL PHYSICAL EXAM: Primary | ICD-10-CM

## 2022-12-06 DIAGNOSIS — I10 PRIMARY HYPERTENSION: ICD-10-CM

## 2022-12-06 LAB — MAGNESIUM SERPL-MCNC: 2.1 MG/DL (ref 1.6–2.4)

## 2022-12-06 PROCEDURE — G0439 PPPS, SUBSEQ VISIT: HCPCS | Performed by: FAMILY MEDICINE

## 2022-12-06 PROCEDURE — 1159F MED LIST DOCD IN RCRD: CPT | Performed by: FAMILY MEDICINE

## 2022-12-06 PROCEDURE — 99213 OFFICE O/P EST LOW 20 MIN: CPT | Performed by: FAMILY MEDICINE

## 2022-12-06 PROCEDURE — 1170F FXNL STATUS ASSESSED: CPT | Performed by: FAMILY MEDICINE

## 2022-12-06 RX ORDER — GLYCERIN 0.22 G/100ML
SOLUTION/ DROPS OPHTHALMIC
COMMUNITY

## 2022-12-06 NOTE — ASSESSMENT & PLAN NOTE
Not currently on medications.  Blood pressure today is running a little above goal.  Continue to monitor closely.  Labs reviewed and up-to-date.  He would like to check a magnesium level with the recent dizziness he has been experiencing.  Ordered.

## 2022-12-06 NOTE — ASSESSMENT & PLAN NOTE
Colonoscopy no longer indicated by age and history.  Prostate cancer screening no longer indicated by age and history.  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  He is on Prolia.  He is UTD on Covid (due for bivalent booster - declines), Pneumovax (10/2006), Cakhdkj23 (2/2016), Zostavax (6/2007), and flu (10/2022).  He is due for Shingrix and Td.  Shingrix can be done at the pharmacy.  He is UTD on routine labs including CMP.

## 2022-12-06 NOTE — PROGRESS NOTES
The ABCs of the Annual Wellness Visit  Subsequent Medicare Wellness Visit    Subjective    Anirudh Lomeli is a 87 y.o. male who presents for a Subsequent Medicare Wellness Visit.    Colonoscopy no longer indicated by age and history.  Prostate cancer screening no longer indicated by age and history.  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  He is on Prolia.  He is UTD on Covid (due for bivalent booster), Pneumovax (10/2006), Fanijqf21 (2/2016), Zostavax (6/2007), and flu (10/2022).  He is due for Shingrix and Td.  He is UTD on routine labs including CMP.    The following portions of the patient's history were reviewed and   updated as appropriate: allergies, current medications, past family history, past medical history, past social history, past surgical history and problem list.    Compared to one year ago, the patient feels his physical   health is worse.    Compared to one year ago, the patient feels his mental   health is worse.    Recent Hospitalizations:  He was not admitted to the hospital during the last year.       Current Medical Providers:  Patient Care Team:  Magali Worrell MD as PCP - General (Family Medicine)  Monisha Mahan RegSched Rep as Medical Assistant    Outpatient Medications Prior to Visit   Medication Sig Dispense Refill   • B Complex Vitamins (vitamin b complex) capsule capsule Take  by mouth Daily.     • Calcium-Magnesium 250-125 MG tablet Take  by mouth.     • cholecalciferol (VITAMIN D3) 25 MCG (1000 UT) tablet Take 1,000 Units by mouth Daily.     • Cyanocobalamin (B-12 PO) Take  by mouth.     • Fluticasone Propionate (FLONASE ALLERGY RELIEF NA) into the nostril(s) as directed by provider.     • Glycerin PF (Oasis Tears PF) 0.22 % solution Apply  to eye(s) as directed by provider.     • meclizine (ANTIVERT) 25 MG tablet Take 1 tablet by mouth 3 (Three) Times a Day As Needed for Dizziness. 30 tablet 0   • niacin 500 MG tablet Take 500 mg by mouth.    "  • potassium chloride (MICRO-K) 10 MEQ CR capsule TAKE 1 CAPSULE EVERY DAY 90 capsule 1   • Probiotic Product (PROBIOTIC-10 PO) Take 1 capsule by mouth Daily.     • diazePAM (VALIUM) 5 MG tablet Take 1 tablet by mouth Daily. 40 mins prior to MRI study 1 tablet 0   • docusate sodium 100 MG capsule Take 100 mg by mouth.     • doxazosin (Cardura) 1 MG tablet Take 1 tablet by mouth Every Night. 30 tablet 5     Facility-Administered Medications Prior to Visit   Medication Dose Route Frequency Provider Last Rate Last Admin   • denosumab (PROLIA) syringe 60 mg  60 mg Subcutaneous Q6 Months Magali Worrell MD   60 mg at 03/22/22 0903       No opioid medication identified on active medication list. I have reviewed chart for other potential  high risk medication/s and harmful drug interactions in the elderly.          Aspirin is not on active medication list.  Aspirin use is not indicated based on review of current medical condition/s. Risk of harm outweighs potential benefits.  .    Patient Active Problem List   Diagnosis   • Obstructive sleep apnea   • Primary hypertension   • Postpolio syndrome   • Presbycusis of both ears   • Hypoxia, sleep related   • Cyst of pancreas   • Age-related osteoporosis without current pathological fracture   • Chronic low back pain   • Annual physical exam   • Benign prostatic hyperplasia with nocturia   • Lacunar infarction (HCC)     Advance Care Planning  Advance Directive is not on file.  ACP discussion was held with the patient during this visit. Patient does not have an advance directive, information provided.     Objective    Vitals:    12/06/22 1305 12/06/22 1345   BP: 143/57 149/73   BP Location: Left arm Left arm   Patient Position: Sitting Sitting   Pulse: 52 54   Weight: 73.9 kg (163 lb)    Height: 167.6 cm (65.98\")      Estimated body mass index is 26.32 kg/m² as calculated from the following:    Height as of this encounter: 167.6 cm (65.98\").    Weight as of this " encounter: 73.9 kg (163 lb).    BMI is >= 25 and <30. (Overweight) The following options were offered after discussion;: exercise counseling/recommendations and nutrition counseling/recommendations      Does the patient have evidence of cognitive impairment? No          HEALTH RISK ASSESSMENT    Smoking Status:  Social History     Tobacco Use   Smoking Status Former   • Types: Cigarettes   • Quit date:    • Years since quittin.9   Smokeless Tobacco Never     Alcohol Consumption:  Social History     Substance and Sexual Activity   Alcohol Use Yes     Fall Risk Screen:    STEADI Fall Risk Assessment was completed, and patient is at LOW risk for falls.Assessment completed on:2022    Depression Screening:  PHQ-2/PHQ-9 Depression Screening 2022   Retired PHQ-9 Total Score -   Retired Total Score -   Little Interest or Pleasure in Doing Things 0-->not at all   Feeling Down, Depressed or Hopeless 0-->not at all   PHQ-9: Brief Depression Severity Measure Score 0       Health Habits and Functional and Cognitive Screening:  Functional & Cognitive Status 2022   Do you have difficulty preparing food and eating? No   Do you have difficulty bathing yourself, getting dressed or grooming yourself? No   Do you have difficulty using the toilet? No   Do you have difficulty moving around from place to place? Yes   Do you have trouble with steps or getting out of a bed or a chair? Yes   Current Diet Unhealthy Diet   Dental Exam Up to date   Eye Exam Up to date   Exercise (times per week) 5 times per week   Current Exercises Include Other   Do you need help using the phone?  No   Are you deaf or do you have serious difficulty hearing?  Yes   Do you need help with transportation? No   Do you need help shopping? No   Do you need help preparing meals?  No   Do you need help with housework?  No   Do you need help with laundry? No   Do you need help taking your medications? No   Do you need help managing money? No   Do  you ever drive or ride in a car without wearing a seat belt? No   Have you felt unusual stress, anger or loneliness in the last month? Yes   Who do you live with? Spouse   If you need help, do you have trouble finding someone available to you? No   Have you been bothered in the last four weeks by sexual problems? No   Do you have difficulty concentrating, remembering or making decisions? No       Age-appropriate Screening Schedule:  Refer to the list below for future screening recommendations based on patient's age, sex and/or medical conditions. Orders for these recommended tests are listed in the plan section. The patient has been provided with a written plan.    Health Maintenance   Topic Date Due   • TDAP/TD VACCINES (1 - Tdap) Never done   • ZOSTER VACCINE (1 of 2) Never done   • DXA SCAN  02/05/2023   • INFLUENZA VACCINE  Completed   • LIPID PANEL  Discontinued                CMS Preventative Services Quick Reference  Risk Factors Identified During Encounter  Immunizations Discussed/Encouraged (specific Immunizations; Tdap and Shingrix  The above risks/problems have been discussed with the patient.  Pertinent information has been shared with the patient in the After Visit Summary.  An After Visit Summary and PPPS were made available to the patient.    Follow Up:   Next Medicare Wellness visit to be scheduled in 1 year.       Additional E&M Note during same encounter follows:  Patient has multiple medical problems which are significant and separately identifiable that require additional work above and beyond the Medicare Wellness Visit.      Chief Complaint  Medicare Wellness-subsequent    Subjective        HPI  Anirudh Lomeli is also being seen today for f/u of routine issues.    He is having fogging of the L eye.  Dr. Coon sent him down to [facility].  He has undergone Lasix but unfortunately did not get much benefit from it.  He goes back there in January.    He has had ongoing dizziness.  Following  "with Neurology.  H/o lacunar infarct in the basal ganglia.      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  He designed his own leg braces.    He is on Prolia for osteoporosis.  DEXA is up-to-date as above.    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    Review of Systems   Constitutional: Negative for chills, fatigue and fever.   HENT: Positive for hearing loss. Negative for congestion and rhinorrhea.    Eyes: Negative for pain and visual disturbance.   Respiratory: Negative for cough and shortness of breath.    Cardiovascular: Negative for chest pain and palpitations.   Gastrointestinal: Negative for abdominal pain, constipation, diarrhea, nausea and vomiting.   Genitourinary: Negative for difficulty urinating and dysuria.   Musculoskeletal: Positive for arthralgias. Negative for myalgias.   Neurological: Positive for dizziness and weakness. Negative for numbness.        +imbalance due to postpolio   Psychiatric/Behavioral: Negative for dysphoric mood and sleep disturbance. The patient is not nervous/anxious.        Objective   Vital Signs:  /73 (BP Location: Left arm, Patient Position: Sitting)   Pulse 54   Ht 167.6 cm (65.98\")   Wt 73.9 kg (163 lb)   BMI 26.32 kg/m²     Physical Exam  Vitals reviewed.   Constitutional:       General: He is not in acute distress.     Appearance: Normal appearance. He is well-developed.   HENT:      Head: Normocephalic and atraumatic.      Right Ear: External ear normal.      Left Ear: External ear normal.      Mouth/Throat:      Mouth: Mucous membranes are moist.   Eyes:      Extraocular Movements: Extraocular movements intact.      Conjunctiva/sclera: Conjunctivae normal.      Pupils: Pupils are equal, round, and reactive to light.   Cardiovascular:      Rate and Rhythm: Normal rate and regular rhythm.      Heart sounds: No murmur heard.  Pulmonary:      Effort: Pulmonary effort is normal.      Breath sounds: Normal breath sounds. " No wheezing, rhonchi or rales.   Abdominal:      General: Bowel sounds are normal. There is no distension.      Palpations: Abdomen is soft.      Tenderness: There is no abdominal tenderness.   Musculoskeletal:         General: Normal range of motion.   Skin:     General: Skin is warm and dry.   Neurological:      Mental Status: He is alert and oriented to person, place, and time.      Deep Tendon Reflexes: Reflexes normal.   Psychiatric:         Mood and Affect: Mood and affect normal.         Behavior: Behavior normal.         Thought Content: Thought content normal.         Judgment: Judgment normal.                    Lab Results   Component Value Date    GLUCOSE 146 (H) 12/05/2022    BUN 18 12/05/2022    CREATININE 0.96 12/05/2022    EGFRIFNONA 87 12/03/2021    EGFRIFAFRI 88 07/20/2018    BCR 18.8 12/05/2022    K 4.0 12/05/2022    CO2 31.3 (H) 12/05/2022    CALCIUM 9.4 12/05/2022    ALBUMIN 3.40 (L) 12/05/2022    LABIL2 1.3 (L) 02/05/2021    AST 24 12/05/2022    ALT 17 12/05/2022       Lab Results   Component Value Date    CHOL 155 07/27/2022    TRIG 56 07/27/2022    HDL 75 (H) 07/27/2022    LDL 68 07/27/2022          Assessment and Plan   Diagnoses and all orders for this visit:    1. Annual physical exam (Primary)  Assessment & Plan:  Colonoscopy no longer indicated by age and history.  Prostate cancer screening no longer indicated by age and history.  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  He is on Prolia.  He is UTD on Covid (due for bivalent booster - declines), Pneumovax (10/2006), Iylrwgg14 (2/2016), Zostavax (6/2007), and flu (10/2022).  He is due for Shingrix and Td.  Shingrix can be done at the pharmacy.  He is UTD on routine labs including CMP.      2. Lacunar infarction (HCC)  Assessment & Plan:  Following with Neurology Sarah CHAMORRO.      3. Primary hypertension  Assessment & Plan:  Not currently on medications.  Blood pressure today is running a little above goal.   Continue to monitor closely.  Labs reviewed and up-to-date.  He would like to check a magnesium level with the recent dizziness he has been experiencing.  Ordered.    Orders:  -     Magnesium; Future           Follow Up   Return in about 6 months (around 6/6/2023) for Recheck.  Patient was given instructions and counseling regarding his condition or for health maintenance advice. Please see specific information pulled into the AVS if appropriate.

## 2023-02-14 ENCOUNTER — TELEPHONE (OUTPATIENT)
Dept: FAMILY MEDICINE CLINIC | Age: 88
End: 2023-02-14

## 2023-02-14 NOTE — TELEPHONE ENCOUNTER
Caller: MARGARET BROWN    Relationship: Emergency Contact    Best call back number: [phone]    Requested Prescriptions:   DOXAZOSIN 1MG TABLET    Pharmacy where request should be sent: [facility] - [place] [id number] - [phone]  - [phone] FX     Additional details provided by patient: THE PATIENT'S WIFE STATED SHE WOULD LIKE THIS MEDICATION SENT TO THE MAIL ORDER PHARMACY AS A 90 DAY SUPPLY    Does the patient have less than a 3 day supply:  [x] Yes  [] No      Arielle Sanders Rep   02/14/23 11:26 EST

## 2023-02-14 NOTE — TELEPHONE ENCOUNTER
I would recommend Adriana contact the prescribing provider, as we do not even have a record of this being on Anirudh's med list, so I do not know why he is taking it.  Thanks, BZM

## 2023-02-14 NOTE — TELEPHONE ENCOUNTER
Pt states she thinks that Dr Worrell prescribes this for him.  She will look at the bottle when she gets home and let me know.

## 2023-02-15 RX ORDER — DOXAZOSIN MESYLATE 1 MG/1
1 TABLET ORAL NIGHTLY
Qty: 90 TABLET | Refills: 1 | Status: SHIPPED | OUTPATIENT
Start: 2023-02-15

## 2023-02-15 NOTE — TELEPHONE ENCOUNTER
Adriana brought the pts medication bottle into the office this am and it has Dr Worrell as the prescriber.  Please advise.

## 2023-02-15 NOTE — TELEPHONE ENCOUNTER
How odd.  Sorry for the confusion.  In that case, I will go ahead and send it and try to look back at the chart to figure out what happened.  My apologies! Thanks, CHAYITO

## 2023-05-02 ENCOUNTER — CLINICAL SUPPORT (OUTPATIENT)
Dept: FAMILY MEDICINE CLINIC | Age: 88
End: 2023-05-02
Payer: MEDICARE

## 2023-05-02 DIAGNOSIS — M81.0 AGE-RELATED OSTEOPOROSIS WITHOUT CURRENT PATHOLOGICAL FRACTURE: Primary | ICD-10-CM

## 2023-06-08 ENCOUNTER — OFFICE VISIT (OUTPATIENT)
Dept: FAMILY MEDICINE CLINIC | Age: 88
End: 2023-06-08
Payer: MEDICARE

## 2023-06-08 VITALS
SYSTOLIC BLOOD PRESSURE: 139 MMHG | OXYGEN SATURATION: 98 % | BODY MASS INDEX: 23.85 KG/M2 | HEART RATE: 52 BPM | HEIGHT: 66 IN | WEIGHT: 148.4 LBS | DIASTOLIC BLOOD PRESSURE: 51 MMHG

## 2023-06-08 DIAGNOSIS — E55.9 VITAMIN D DEFICIENCY: ICD-10-CM

## 2023-06-08 DIAGNOSIS — I63.81 LACUNAR INFARCTION: ICD-10-CM

## 2023-06-08 DIAGNOSIS — K59.09 OTHER CONSTIPATION: Primary | ICD-10-CM

## 2023-06-08 DIAGNOSIS — G47.33 OBSTRUCTIVE SLEEP APNEA: ICD-10-CM

## 2023-06-08 DIAGNOSIS — G14 POSTPOLIO SYNDROME: ICD-10-CM

## 2023-06-08 DIAGNOSIS — M81.0 AGE-RELATED OSTEOPOROSIS WITHOUT CURRENT PATHOLOGICAL FRACTURE: ICD-10-CM

## 2023-06-08 DIAGNOSIS — I10 PRIMARY HYPERTENSION: ICD-10-CM

## 2023-06-08 DIAGNOSIS — R73.03 PREDIABETES: ICD-10-CM

## 2023-06-08 PROBLEM — Z00.00 ANNUAL PHYSICAL EXAM: Status: RESOLVED | Noted: 2021-12-03 | Resolved: 2023-06-08

## 2023-06-08 PROCEDURE — 1160F RVW MEDS BY RX/DR IN RCRD: CPT | Performed by: FAMILY MEDICINE

## 2023-06-08 PROCEDURE — 99214 OFFICE O/P EST MOD 30 MIN: CPT | Performed by: FAMILY MEDICINE

## 2023-06-08 PROCEDURE — 1159F MED LIST DOCD IN RCRD: CPT | Performed by: FAMILY MEDICINE

## 2023-06-08 RX ORDER — MULTIVIT WITH MINERALS/LUTEIN
250 TABLET ORAL DAILY
COMMUNITY

## 2023-06-08 NOTE — ASSESSMENT & PLAN NOTE
OK to use Miralax daily as needed.  Ahsan use of Dulcolax in addition to this, but I would not want him to use that very regularly.  If he has an episode of constipation despite use of the Miralax, however, it would be a good adjunct.

## 2023-06-08 NOTE — PROGRESS NOTES
Chief Complaint  Osteoporosis (6 month f/u)    Subjective          Anirudh Lomeli presents to [facility] today for routine follow-up on chronic problems.  His wife Adriana     He has had constipation problems ever since cholecystectomy 40 yrs ago.  He has been using Miralax intermittently.      He has had ongoing dizziness.  Has been evaluated by Neurology.  H/o lacunar infarct in the basal ganglia.  Continue aspirin 81 mg daily.  He was released to as needed follow-up.     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  He designed his own leg braces.  Very active and goes to the gym very regularly.       He is on Prolia for osteoporosis.  Due for DEXA.     He is on CPAP for sleep apnea.      Current Outpatient Medications:     B Complex Vitamins (vitamin b complex) capsule capsule, Take  by mouth Daily., Disp: , Rfl:     Calcium-Magnesium 250-125 MG tablet, Take  by mouth 2 (Two) Times a Day., Disp: , Rfl:     cholecalciferol (VITAMIN D3) 25 MCG (1000 UT) tablet, Take 1 tablet by mouth Daily., Disp: , Rfl:     Cyanocobalamin (B-12 PO), Take  by mouth., Disp: , Rfl:     doxazosin (Cardura) 1 MG tablet, Take 1 tablet by mouth Every Night., Disp: 90 tablet, Rfl: 1    Fluticasone Propionate (FLONASE ALLERGY RELIEF NA), into the nostril(s) as directed by provider., Disp: , Rfl:     Glycerin PF (Oasis Tears PF) 0.22 % solution, Apply  to eye(s) as directed by provider., Disp: , Rfl:     meclizine (ANTIVERT) 25 MG tablet, Take 1 tablet by mouth 3 (Three) Times a Day As Needed for Dizziness., Disp: 30 tablet, Rfl: 0    niacin 500 MG tablet, Take 1 tablet by mouth 2 (Two) Times a Day., Disp: , Rfl:     potassium chloride (MICRO-K) 10 MEQ CR capsule, TAKE 1 CAPSULE EVERY DAY, Disp: 90 capsule, Rfl: 1    Probiotic Product (PROBIOTIC-10 PO), Take 1 capsule by mouth Daily., Disp: , Rfl:     vitamin C (ASCORBIC ACID) 250 MG tablet, Take 1 tablet by mouth  "Daily., Disp: , Rfl:     Current Facility-Administered Medications:     denosumab (PROLIA) syringe 60 mg, 60 mg, Subcutaneous, Q6 Months, Magali Worrell MD, 60 mg at 03/22/22 0903    denosumab (PROLIA) syringe 60 mg, 60 mg, Subcutaneous, Q6 Months, Magali Worrell MD, 60 mg at 05/02/23 0908    Allergies:  Patient has no known allergies.      Objective   Vital Signs:   Vitals:    06/08/23 1537   BP: 139/51   BP Location: Left arm   Patient Position: Sitting   Cuff Size: Adult   Pulse: 52   SpO2: 98%   Weight: 67.3 kg (148 lb 6.4 oz)   Height: 167.6 cm (65.98\")       Physical Exam  Vitals reviewed.   Constitutional:       General: He is not in acute distress.     Appearance: Normal appearance. He is well-developed.   HENT:      Head: Normocephalic and atraumatic.      Right Ear: External ear normal.      Left Ear: External ear normal.      Mouth/Throat:      Mouth: Mucous membranes are moist.   Eyes:      Extraocular Movements: Extraocular movements intact.      Conjunctiva/sclera: Conjunctivae normal.      Pupils: Pupils are equal, round, and reactive to light.   Cardiovascular:      Rate and Rhythm: Normal rate and regular rhythm.      Heart sounds: No murmur heard.  Pulmonary:      Effort: Pulmonary effort is normal.      Breath sounds: Normal breath sounds. No wheezing, rhonchi or rales.   Abdominal:      General: Bowel sounds are normal. There is no distension.      Palpations: Abdomen is soft.      Tenderness: There is no abdominal tenderness.   Musculoskeletal:         General: Normal range of motion.      Comments: Drop-foot with brace on R leg  Walker for ambulation   Neurological:      Mental Status: He is alert and oriented to person, place, and time.   Psychiatric:         Mood and Affect: Affect normal.         Lab Results   Component Value Date    GLUCOSE 146 (H) 12/05/2022    BUN 18 12/05/2022    CREATININE 0.96 12/05/2022    EGFRIFNONA 87 12/03/2021    EGFRIFAFRI 88 07/20/2018    BCR " 18.8 12/05/2022    K 4.0 12/05/2022    CO2 31.3 (H) 12/05/2022    CALCIUM 9.4 12/05/2022    ALBUMIN 3.40 (L) 12/05/2022    LABIL2 1.3 (L) 02/05/2021    AST 24 12/05/2022    ALT 17 12/05/2022       Lab Results   Component Value Date    CHOL 155 07/27/2022    TRIG 56 07/27/2022    HDL 75 (H) 07/27/2022    LDL 68 07/27/2022            Assessment and Plan    Diagnoses and all orders for this visit:    1. Other constipation (Primary)  Assessment & Plan:  OK to use Miralax daily as needed.  Ahsan use of Dulcolax in addition to this, but I would not want him to use that very regularly.  If he has an episode of constipation despite use of the Miralax, however, it would be a good adjunct.      2. Age-related osteoporosis without current pathological fracture  Overview:  On Prolia.  Last DEXA was done 2/2021 and showed osteopenia with osteoporosis of the right trochanter (improved).    Assessment & Plan:  DEXA needs updating.  Ordered as below.    Orders:  -     DEXA Bone Density Axial; Future    3. Postpolio syndrome  Overview:  Stable.  He is very active and goes to the gym regularly.  Wears leg braces.    Orders:  -     Vitamin B12 & Folate; Future  -     Vitamin B1, Whole Blood; Future    4. Obstructive sleep apnea  Overview:  On CPAP.      5. Lacunar infarction  Overview:  Evaluated by Neurology.  Recommendation made to continue aspirin daily.    Released to as needed follow-up.      6. Primary hypertension  Overview:  Diet controlled.    Assessment & Plan:  Checking labs.  Continue to monitor.    Orders:  -     Comprehensive Metabolic Panel; Future  -     CBC Auto Differential; Future    7. Prediabetes  -     Hemoglobin A1c; Future    8. Vitamin D deficiency  -     Vitamin D,25-Hydroxy; Future        Follow Up   Return in about 6 months (around 12/8/2023) for Medicare Wellness.  Patient was given instructions and counseling regarding his condition or for health maintenance advice. Please see specific information pulled  into the AVS if appropriate.           06/08/2023

## 2023-06-12 ENCOUNTER — LAB (OUTPATIENT)
Dept: LAB | Facility: HOSPITAL | Age: 88
End: 2023-06-12
Payer: MEDICARE

## 2023-06-12 DIAGNOSIS — G14 POSTPOLIO SYNDROME: ICD-10-CM

## 2023-06-12 DIAGNOSIS — E55.9 VITAMIN D DEFICIENCY: ICD-10-CM

## 2023-06-12 DIAGNOSIS — R73.03 PREDIABETES: ICD-10-CM

## 2023-06-12 DIAGNOSIS — I10 PRIMARY HYPERTENSION: ICD-10-CM

## 2023-06-12 LAB
25(OH)D3 SERPL-MCNC: 63 NG/ML (ref 30–100)
ALBUMIN SERPL-MCNC: 3.9 G/DL (ref 3.5–5.2)
ALBUMIN/GLOB SERPL: 1.6 G/DL
ALP SERPL-CCNC: 91 U/L (ref 39–117)
ALT SERPL W P-5'-P-CCNC: 15 U/L (ref 1–41)
ANION GAP SERPL CALCULATED.3IONS-SCNC: 9 MMOL/L (ref 5–15)
AST SERPL-CCNC: 24 U/L (ref 1–40)
BASOPHILS # BLD AUTO: 0.03 10*3/MM3 (ref 0–0.2)
BASOPHILS NFR BLD AUTO: 0.7 % (ref 0–1.5)
BILIRUB SERPL-MCNC: 0.4 MG/DL (ref 0–1.2)
BUN SERPL-MCNC: 15 MG/DL (ref 8–23)
BUN/CREAT SERPL: 16.3 (ref 7–25)
CALCIUM SPEC-SCNC: 8.8 MG/DL (ref 8.6–10.5)
CHLORIDE SERPL-SCNC: 106 MMOL/L (ref 98–107)
CO2 SERPL-SCNC: 30 MMOL/L (ref 22–29)
CREAT SERPL-MCNC: 0.92 MG/DL (ref 0.76–1.27)
DEPRECATED RDW RBC AUTO: 49.5 FL (ref 37–54)
EGFRCR SERPLBLD CKD-EPI 2021: 80.5 ML/MIN/1.73
EOSINOPHIL # BLD AUTO: 0.16 10*3/MM3 (ref 0–0.4)
EOSINOPHIL NFR BLD AUTO: 3.7 % (ref 0.3–6.2)
ERYTHROCYTE [DISTWIDTH] IN BLOOD BY AUTOMATED COUNT: 13.3 % (ref 12.3–15.4)
FOLATE SERPL-MCNC: 12.1 NG/ML (ref 4.78–24.2)
GLOBULIN UR ELPH-MCNC: 2.4 GM/DL
GLUCOSE SERPL-MCNC: 109 MG/DL (ref 65–99)
HBA1C MFR BLD: 5.7 % (ref 4.8–5.6)
HCT VFR BLD AUTO: 40.8 % (ref 37.5–51)
HGB BLD-MCNC: 14.2 G/DL (ref 13–17.7)
IMM GRANULOCYTES # BLD AUTO: 0 10*3/MM3 (ref 0–0.05)
IMM GRANULOCYTES NFR BLD AUTO: 0 % (ref 0–0.5)
LYMPHOCYTES # BLD AUTO: 1.73 10*3/MM3 (ref 0.7–3.1)
LYMPHOCYTES NFR BLD AUTO: 40.4 % (ref 19.6–45.3)
MCH RBC QN AUTO: 34.9 PG (ref 26.6–33)
MCHC RBC AUTO-ENTMCNC: 34.8 G/DL (ref 31.5–35.7)
MCV RBC AUTO: 100.2 FL (ref 79–97)
MONOCYTES # BLD AUTO: 0.47 10*3/MM3 (ref 0.1–0.9)
MONOCYTES NFR BLD AUTO: 11 % (ref 5–12)
NEUTROPHILS NFR BLD AUTO: 1.89 10*3/MM3 (ref 1.7–7)
NEUTROPHILS NFR BLD AUTO: 44.2 % (ref 42.7–76)
PLATELET # BLD AUTO: 140 10*3/MM3 (ref 140–450)
PMV BLD AUTO: 10.5 FL (ref 6–12)
POTASSIUM SERPL-SCNC: 3.8 MMOL/L (ref 3.5–5.2)
PROT SERPL-MCNC: 6.3 G/DL (ref 6–8.5)
RBC # BLD AUTO: 4.07 10*6/MM3 (ref 4.14–5.8)
SODIUM SERPL-SCNC: 145 MMOL/L (ref 136–145)
VIT B12 BLD-MCNC: 1119 PG/ML (ref 211–946)
WBC NRBC COR # BLD: 4.28 10*3/MM3 (ref 3.4–10.8)

## 2023-06-12 PROCEDURE — 36415 COLL VENOUS BLD VENIPUNCTURE: CPT

## 2023-06-12 PROCEDURE — 85025 COMPLETE CBC W/AUTO DIFF WBC: CPT

## 2023-06-12 PROCEDURE — 82746 ASSAY OF FOLIC ACID SERUM: CPT

## 2023-06-12 PROCEDURE — 84425 ASSAY OF VITAMIN B-1: CPT

## 2023-06-12 PROCEDURE — 80053 COMPREHEN METABOLIC PANEL: CPT

## 2023-06-12 PROCEDURE — 82607 VITAMIN B-12: CPT

## 2023-06-12 PROCEDURE — 83036 HEMOGLOBIN GLYCOSYLATED A1C: CPT

## 2023-06-12 PROCEDURE — 82306 VITAMIN D 25 HYDROXY: CPT

## 2023-06-15 LAB — VIT B1 BLD-SCNC: 106.7 NMOL/L (ref 66.5–200)

## 2023-06-29 ENCOUNTER — TELEPHONE (OUTPATIENT)
Dept: FAMILY MEDICINE CLINIC | Age: 88
End: 2023-06-29

## 2023-06-29 NOTE — TELEPHONE ENCOUNTER
Caller: MARGARET BROWN    Relationship to patient: Emergency Contact    Best call back number: [phone]    Date of exposure: UNKNOWN ATTENDED CONFERENCE LAST WEEK WEDNESDAY THRU SUNDAY     Date of positive COVID19 test: 06/29/2023      COVID19 symptoms: CHILLS, CONGESTION    Date of initial quarantine: 06/28/2023    Additional information or concerns: PATIENT IS TAKING THE OTC MUSINEX  DM AND WANTS TO KNOW IF THERE IS ANOTHER MEDICATION THEY COULD TAKE OR SHOULD THEY JUST WAIT IT OUT, HIS WIFE DID NOT DO THE TEST, BUT IS EXPERIENCING THE SAME SYMPTOMS   PLEASE CONTACT AND ADVISE     What is the patients preferred pharmacy: [facility] [id number] - [place] - [address] [facility] [place] [phone] [facility] [phone] FX

## 2023-10-26 ENCOUNTER — TELEPHONE (OUTPATIENT)
Dept: FAMILY MEDICINE CLINIC | Age: 88
End: 2023-10-26
Payer: MEDICARE

## 2023-10-26 NOTE — TELEPHONE ENCOUNTER
Pt due for Prolia 11/2/23    Last Dexa -2.3  DEXA Bone Density Axial (07/11/2023 11:30)     Last CMP- Calcium 8.8  Comprehensive Metabolic Panel (06/12/2023 07:33)     Last office Visit   Office Visit with Natalie Hernadez APRN (06/29/2023)   Office Visit with Magali Worrell MD (06/08/2023)

## 2023-11-02 ENCOUNTER — CLINICAL SUPPORT (OUTPATIENT)
Dept: FAMILY MEDICINE CLINIC | Age: 88
End: 2023-11-02
Payer: MEDICARE

## 2023-11-02 DIAGNOSIS — M81.0 AGE-RELATED OSTEOPOROSIS WITHOUT CURRENT PATHOLOGICAL FRACTURE: Primary | ICD-10-CM

## 2023-11-10 ENCOUNTER — CLINICAL SUPPORT (OUTPATIENT)
Dept: FAMILY MEDICINE CLINIC | Age: 88
End: 2023-11-10
Payer: MEDICARE

## 2023-11-10 DIAGNOSIS — Z23 IMMUNIZATION DUE: Primary | ICD-10-CM

## 2023-11-14 RX ORDER — POTASSIUM CHLORIDE 750 MG/1
CAPSULE, EXTENDED RELEASE ORAL
Qty: 90 CAPSULE | Refills: 1 | Status: SHIPPED | OUTPATIENT
Start: 2023-11-14

## 2023-11-27 RX ORDER — DOXAZOSIN MESYLATE 1 MG/1
1 TABLET ORAL NIGHTLY
Qty: 90 TABLET | Refills: 1 | Status: SHIPPED | OUTPATIENT
Start: 2023-11-27

## 2023-12-13 ENCOUNTER — OFFICE VISIT (OUTPATIENT)
Dept: FAMILY MEDICINE CLINIC | Age: 88
End: 2023-12-13
Payer: MEDICARE

## 2023-12-13 VITALS
BODY MASS INDEX: 23.37 KG/M2 | DIASTOLIC BLOOD PRESSURE: 66 MMHG | HEART RATE: 55 BPM | WEIGHT: 145.4 LBS | OXYGEN SATURATION: 92 % | SYSTOLIC BLOOD PRESSURE: 126 MMHG | HEIGHT: 66 IN

## 2023-12-13 DIAGNOSIS — M81.0 AGE-RELATED OSTEOPOROSIS WITHOUT CURRENT PATHOLOGICAL FRACTURE: ICD-10-CM

## 2023-12-13 DIAGNOSIS — M79.642 PAIN OF LEFT HAND: ICD-10-CM

## 2023-12-13 DIAGNOSIS — G47.33 OBSTRUCTIVE SLEEP APNEA: ICD-10-CM

## 2023-12-13 DIAGNOSIS — G14 POSTPOLIO SYNDROME: ICD-10-CM

## 2023-12-13 DIAGNOSIS — Z23 ENCOUNTER FOR IMMUNIZATION: ICD-10-CM

## 2023-12-13 DIAGNOSIS — I10 PRIMARY HYPERTENSION: ICD-10-CM

## 2023-12-13 DIAGNOSIS — Z00.00 ANNUAL PHYSICAL EXAM: Primary | ICD-10-CM

## 2023-12-13 RX ORDER — ASPIRIN 81 MG/1
81 TABLET, CHEWABLE ORAL DAILY
COMMUNITY

## 2023-12-13 NOTE — PROGRESS NOTES
The ABCs of the Annual Wellness Visit  Subsequent Medicare Wellness Visit    Subjective    Anirudh Lomeli is a 88 y.o. male who presents for a Subsequent Medicare Wellness Visit.  His wife Adriana accompanies him today.    Colonoscopy no longer indicated by age and history.  Prostate cancer screening no longer indicated by age and history.  He is UTD on DEXA, last done 7/2023 and this showed osteoporosis.  He is on Prolia.  He is UTD on Pneumovax (10/2006), Slwrjnl59 (2/2016), Zostavax (6/2007), and flu (11/2023).  He is due for COVID, Shingrix, and Td.  He is UTD on routine labs including CMP and CBC.     The following portions of the patient's history were reviewed and   updated as appropriate: allergies, current medications, past family history, past medical history, past social history, past surgical history, and problem list.    Compared to one year ago, the patient feels his physical   health is the same.    Compared to one year ago, the patient feels his mental   health is the same.    Recent Hospitalizations:  He was not admitted to the hospital during the last year.       Current Medical Providers:  Patient Care Team:  Magali Worrell MD as PCP - General (Family Medicine)    Outpatient Medications Prior to Visit   Medication Sig Dispense Refill    B Complex Vitamins (vitamin b complex) capsule capsule Take  by mouth Daily.      Calcium-Magnesium 250-125 MG tablet Take  by mouth 2 (Two) Times a Day.      cholecalciferol (VITAMIN D3) 25 MCG (1000 UT) tablet Take 1 tablet by mouth Daily.      Cyanocobalamin (B-12 PO) Take  by mouth.      doxazosin (CARDURA) 1 MG tablet TAKE 1 TABLET EVERY NIGHT 90 tablet 1    Glycerin PF (Oasis Tears PF) 0.22 % solution Apply  to eye(s) as directed by provider.      meclizine (ANTIVERT) 25 MG tablet Take 1 tablet by mouth 3 (Three) Times a Day As Needed for Dizziness. 30 tablet 0    niacin 500 MG tablet Take 1 tablet by mouth 2 (Two) Times a Day.      potassium  chloride (MICRO-K) 10 MEQ CR capsule TAKE 1 CAPSULE EVERY DAY 90 capsule 1    Probiotic Product (PROBIOTIC-10 PO) Take 1 capsule by mouth Daily.      TURMERIC PO Take  by mouth.      vitamin C (ASCORBIC ACID) 250 MG tablet Take 1 tablet by mouth Daily.      aspirin 81 MG chewable tablet Chew 1 tablet Daily.      Fluticasone Propionate (FLONASE ALLERGY RELIEF NA) into the nostril(s) as directed by provider. (Patient not taking: Reported on 12/13/2023)       Facility-Administered Medications Prior to Visit   Medication Dose Route Frequency Provider Last Rate Last Admin    denosumab (PROLIA) syringe 60 mg  60 mg Subcutaneous Q6 Months Magali Worrell MD   60 mg at 03/22/22 0903    denosumab (PROLIA) syringe 60 mg  60 mg Subcutaneous Q6 Months Magali Worrell MD   60 mg at 11/02/23 0906       No opioid medication identified on active medication list. I have reviewed chart for other potential  high risk medication/s and harmful drug interactions in the elderly.        Aspirin is not on active medication list.  Aspirin use is indicated based on review of current medical condition/s. Pros and cons of this therapy have been discussed with this patient. Benefits of this medication outweigh potential harm.  Patient has been instructed to start taking this medication.    Patient Active Problem List   Diagnosis    Obstructive sleep apnea    Primary hypertension    Postpolio syndrome    Presbycusis of both ears    Hypoxia, sleep related    Cyst of pancreas    Age-related osteoporosis without current pathological fracture    Chronic low back pain    Annual physical exam    Benign prostatic hyperplasia with nocturia    Lacunar infarction    Other constipation    Pain of left hand     Advance Care Planning   Advance Care Planning     Advance Directive is not on file.  ACP discussion was held with the patient during this visit. Patient does not have an advance directive, information provided.     Objective   "  Vitals:    23 0934   BP: 126/66   BP Location: Right arm   Patient Position: Sitting   Cuff Size: Adult   Pulse: 55   SpO2: 92%   Weight: 66 kg (145 lb 6.4 oz)   Height: 167.6 cm (65.98\")     Estimated body mass index is 23.48 kg/m² as calculated from the following:    Height as of this encounter: 167.6 cm (65.98\").    Weight as of this encounter: 66 kg (145 lb 6.4 oz).    BMI is within normal parameters. No other follow-up for BMI required.      Does the patient have evidence of cognitive impairment? No          HEALTH RISK ASSESSMENT    Smoking Status:  Social History     Tobacco Use   Smoking Status Former    Types: Cigarettes    Quit date: 1964    Years since quittin.9   Smokeless Tobacco Never     Alcohol Consumption:  Social History     Substance and Sexual Activity   Alcohol Use Yes     Fall Risk Screen:    LUCYADI Fall Risk Assessment was completed, and patient is at LOW risk for falls.Assessment completed on:2023    Depression Screenin/13/2023     9:40 AM   PHQ-2/PHQ-9 Depression Screening   Little Interest or Pleasure in Doing Things 0-->not at all   Feeling Down, Depressed or Hopeless 0-->not at all   PHQ-9: Brief Depression Severity Measure Score 0       Health Habits and Functional and Cognitive Screenin/13/2023     9:40 AM   Functional & Cognitive Status   Do you have difficulty preparing food and eating? No   Do you have difficulty bathing yourself, getting dressed or grooming yourself? No   Do you have difficulty using the toilet? No   Do you have difficulty moving around from place to place? Yes   Do you have trouble with steps or getting out of a bed or a chair? Yes   Current Diet Well Balanced Diet   Dental Exam Up to date   Eye Exam Up to date   Exercise (times per week) 5 times per week   Current Exercises Include Stationary Bicycling/Spin Class;Walking;Other   Do you need help using the phone?  No   Are you deaf or do you have serious difficulty hearing?  " Yes   Do you need help to go to places out of walking distance? Yes   Do you need help shopping? No   Do you need help preparing meals?  No   Do you need help with housework?  No   Do you need help with laundry? No   Do you need help taking your medications? No   Do you need help managing money? No   Do you ever drive or ride in a car without wearing a seat belt? No   Have you felt unusual stress, anger or loneliness in the last month? Yes   Who do you live with? Spouse   If you need help, do you have trouble finding someone available to you? No   Have you been bothered in the last four weeks by sexual problems? No   Do you have difficulty concentrating, remembering or making decisions? No       Age-appropriate Screening Schedule:  Refer to the list below for future screening recommendations based on patient's age, sex and/or medical conditions. Orders for these recommended tests are listed in the plan section. The patient has been provided with a written plan.    Health Maintenance   Topic Date Due    TDAP/TD VACCINES (1 - Tdap) Never done    ZOSTER VACCINE (1 of 2) Never done    COVID-19 Vaccine (4 - 2023-24 season) 12/15/2023 (Originally 9/1/2023)    ANNUAL WELLNESS VISIT  12/13/2024    DXA SCAN  07/11/2025    INFLUENZA VACCINE  Completed    Pneumococcal Vaccine 65+  Completed    LIPID PANEL  Discontinued                  CMS Preventative Services Quick Reference  Risk Factors Identified During Encounter  Immunizations Discussed/Encouraged: Tdap and Shingrix  The above risks/problems have been discussed with the patient.  Pertinent information has been shared with the patient in the After Visit Summary.  An After Visit Summary and PPPS were made available to the patient.    Follow Up:   Next Medicare Wellness visit to be scheduled in 1 year.       Additional E&M Note during same encounter follows:  Patient has multiple medical problems which are significant and separately identifiable that require additional work  above and beyond the Medicare Wellness Visit.      Chief Complaint  Medicare Wellness-subsequent    Subjective        HPI  Anirudh Lomeli is also being seen today for routine f/u of chronic issues.    He is on doxazosin for HTN.  BP has been well controlled.  No CP, palpitations, SOB.      He has been taking local honey to try to decrease his seasonal allergies.  That does seem to have helped.  However, he is worried that this is affecting his sugar.  He also eats 2 gingersnaps daily.      He has started turmeric tea which is helping his constipation as well as sleep quality.      H/o lacunar infarct in the basal ganglia.  Has been evaluated by Neurology.  Continue aspirin 81 mg daily.  He was released to as needed follow-up.     He has postpolio syndrome which has caused slowly progressive weakness in the legs.  He wears leg braces of his own design.  He remains very active and goes to the gym regularly.  He ambulates with a cane.  However, he is noticing some pressure on the L hand from the cane.       He is on Prolia for osteoporosis.  He is UTD on DEXA, last done 7/2023 and this showed osteoporosis, slightly worsened in the hip with R total hip T-score of -2.6.      He is on CPAP for sleep apnea.    Review of Systems   Constitutional:  Negative for chills, fatigue and fever.   HENT:  Positive for hearing loss. Negative for congestion and rhinorrhea.    Eyes:  Negative for pain and visual disturbance.   Respiratory:  Negative for cough and shortness of breath.    Cardiovascular:  Negative for chest pain and palpitations.   Gastrointestinal:  Negative for abdominal pain, constipation, diarrhea, nausea and vomiting.   Genitourinary:  Negative for difficulty urinating and dysuria.   Musculoskeletal:  Positive for arthralgias. Negative for myalgias.   Neurological:  Positive for dizziness and weakness. Negative for numbness.        +imbalance due to postpolio   Psychiatric/Behavioral:  Negative for dysphoric mood and  "sleep disturbance. The patient is not nervous/anxious.        Objective   Vital Signs:  /66 (BP Location: Right arm, Patient Position: Sitting, Cuff Size: Adult)   Pulse 55   Ht 167.6 cm (65.98\")   Wt 66 kg (145 lb 6.4 oz)   SpO2 92%   BMI 23.48 kg/m²     Physical Exam  Vitals reviewed.   Constitutional:       General: He is not in acute distress.     Appearance: Normal appearance. He is well-developed.   HENT:      Head: Normocephalic and atraumatic.      Right Ear: External ear normal.      Left Ear: External ear normal.      Mouth/Throat:      Mouth: Mucous membranes are moist.   Eyes:      Extraocular Movements: Extraocular movements intact.      Conjunctiva/sclera: Conjunctivae normal.      Pupils: Pupils are equal, round, and reactive to light.   Cardiovascular:      Rate and Rhythm: Normal rate and regular rhythm.      Heart sounds: No murmur heard.  Pulmonary:      Effort: Pulmonary effort is normal.      Breath sounds: Normal breath sounds. No wheezing, rhonchi or rales.   Abdominal:      General: Bowel sounds are normal. There is no distension.      Palpations: Abdomen is soft.      Tenderness: There is no abdominal tenderness.   Musculoskeletal:         General: Normal range of motion.   Skin:     General: Skin is warm and dry.   Neurological:      Mental Status: He is alert and oriented to person, place, and time.      Deep Tendon Reflexes: Reflexes normal.   Psychiatric:         Mood and Affect: Mood and affect normal.         Behavior: Behavior normal.         Thought Content: Thought content normal.         Judgment: Judgment normal.                    Lab Results   Component Value Date    GLUCOSE 109 (H) 06/12/2023    BUN 15 06/12/2023    CREATININE 0.92 06/12/2023    EGFR 80.5 06/12/2023    BCR 16.3 06/12/2023    K 3.8 06/12/2023    CO2 30.0 (H) 06/12/2023    CALCIUM 8.8 06/12/2023    ALBUMIN 3.9 06/12/2023    BILITOT 0.4 06/12/2023    AST 24 06/12/2023    ALT 15 06/12/2023       Lab " Results   Component Value Date    CHOL 155 07/27/2022    TRIG 56 07/27/2022    HDL 75 (H) 07/27/2022    LDL 68 07/27/2022       Lab Results   Component Value Date    WBC 4.28 06/12/2023    HGB 14.2 06/12/2023    HCT 40.8 06/12/2023    .2 (H) 06/12/2023     06/12/2023          Assessment and Plan   Diagnoses and all orders for this visit:    1. Annual physical exam (Primary)  Assessment & Plan:  Colonoscopy no longer indicated by age and history.  Prostate cancer screening no longer indicated by age and history.  He is UTD on DEXA, last done 7/2023 and this showed osteoporosis.  He is on Prolia.  He is UTD on Pneumovax (10/2006), Odfvjtc80 (2/2016), Zostavax (6/2007), and flu (11/2023).  He is due for COVID, Shingrix, and Td.  COVID given today.  Shingrix and Tdap can be done at the pharmacy.  He is UTD on routine labs including CMP and CBC.       2. Pain of left hand  Assessment & Plan:  Start OTC diclofenac gel 1% to the L hand.      3. Primary hypertension  Assessment & Plan:  Blood pressure has been running at goal.  Continue doxazosin 1 mg QHS.  Refills were not needed today.  Labs were needed today.  Continue to monitor.        4. Postpolio syndrome  Overview:  Stable.  He is very active and goes to the gym regularly.  Wears leg braces.      5. Age-related osteoporosis without current pathological fracture  Overview:  He is on Prolia for osteoporosis.  He is UTD on DEXA, last done 7/2023 and this showed osteoporosis, slightly worsened in the hip with R total hip T-score of -2.6.       6. Obstructive sleep apnea  Overview:  On CPAP.      7. Encounter for immunization  -     COVID-19 F23 (Pfizer) 12yrs+ (COMIRNATY)               Follow Up   Return in about 6 months (around 6/13/2024) for Recheck.  Patient was given instructions and counseling regarding his condition or for health maintenance advice. Please see specific information pulled into the AVS if appropriate.

## 2023-12-13 NOTE — ASSESSMENT & PLAN NOTE
Blood pressure has been running at goal.  Continue doxazosin 1 mg QHS.  Refills were not needed today.  Labs were needed today.  Continue to monitor.

## 2023-12-13 NOTE — ASSESSMENT & PLAN NOTE
Colonoscopy no longer indicated by age and history.  Prostate cancer screening no longer indicated by age and history.  He is UTD on DEXA, last done 7/2023 and this showed osteoporosis.  He is on Prolia.  He is UTD on Pneumovax (10/2006), Riguozn69 (2/2016), Zostavax (6/2007), and flu (11/2023).  He is due for COVID, Shingrix, and Td.  COVID given today.  Shingrix and Tdap can be done at the pharmacy.  He is UTD on routine labs including CMP and CBC.

## 2023-12-28 ENCOUNTER — OFFICE VISIT (OUTPATIENT)
Dept: FAMILY MEDICINE CLINIC | Age: 88
End: 2023-12-28
Payer: MEDICARE

## 2023-12-28 ENCOUNTER — HOSPITAL ENCOUNTER (OUTPATIENT)
Dept: GENERAL RADIOLOGY | Facility: HOSPITAL | Age: 88
Discharge: HOME | End: 2023-12-28
Admitting: PHYSICIAN ASSISTANT
Payer: MEDICARE

## 2023-12-28 VITALS
WEIGHT: 145.4 LBS | BODY MASS INDEX: 23.37 KG/M2 | OXYGEN SATURATION: 98 % | HEIGHT: 66 IN | DIASTOLIC BLOOD PRESSURE: 68 MMHG | TEMPERATURE: 97.6 F | HEART RATE: 53 BPM | SYSTOLIC BLOOD PRESSURE: 146 MMHG

## 2023-12-28 DIAGNOSIS — R10.31 RLQ ABDOMINAL PAIN: Primary | ICD-10-CM

## 2023-12-28 DIAGNOSIS — K40.90 HERNIA, INGUINAL, RIGHT: ICD-10-CM

## 2023-12-28 DIAGNOSIS — R10.31 RLQ ABDOMINAL PAIN: ICD-10-CM

## 2023-12-28 PROCEDURE — 1159F MED LIST DOCD IN RCRD: CPT | Performed by: PHYSICIAN ASSISTANT

## 2023-12-28 PROCEDURE — 74018 RADEX ABDOMEN 1 VIEW: CPT

## 2023-12-28 PROCEDURE — 99213 OFFICE O/P EST LOW 20 MIN: CPT | Performed by: PHYSICIAN ASSISTANT

## 2023-12-28 PROCEDURE — 1160F RVW MEDS BY RX/DR IN RCRD: CPT | Performed by: PHYSICIAN ASSISTANT

## 2023-12-28 NOTE — PROGRESS NOTES
Diagnoses and all orders for this visit:    1. RLQ abdominal pain (Primary)  -     XR Abdomen KUB; Future    2. Hernia, inguinal, right  -     US Abdomen Limited; Future  -     US Soft Tissue; Future            Subjective     CHIEF COMPLAINT    Chief Complaint   Patient presents with    Flank Pain     (R) pain radiates down to (R) groin. Pt states it is not tender to touch. X 1 month. Pt states when he lies down he's fine, when standing or walking for a while it starts to ache.             History of Present Illness  This is an 88-year-old male presenting to the clinic complaining of right-sided groin pain for the last 2 weeks.  He states it is a very mild aching pain that comes and goes.  He notices it when he sits for a period of time and states it improves when he stands up and massages the area gently.  He has no history of similar symptoms.  He does report some constipation but states that easily resolves with MiraLAX and states his last bowel movement was yesterday.  Denies any urinary symptoms including dysuria or hematuria.  He has not felt a palpable mass.            Review of Systems   Constitutional:  Negative for chills and fever.   Gastrointestinal:  Positive for abdominal pain (Right groin) and constipation. Negative for diarrhea, nausea and vomiting.   Genitourinary:  Negative for dysuria, hematuria, scrotal swelling and testicular pain.            Past Medical History:   Diagnosis Date    Dizziness     HL (hearing loss)     Hyperlipidemia     Hypertension     Hypoxia, sleep related     Low back pain     Osteoporosis     Postpolio syndrome     Sleep apnea             Past Surgical History:   Procedure Laterality Date    ANKLE FUSION Left     BLEPHAROPLASTY Bilateral     upper lids    CHOLECYSTECTOMY  1984            Family History   Problem Relation Age of Onset    Stroke Father     Coronary artery disease Brother     Hyperlipidemia Brother             Social History     Socioeconomic History     "Marital status:     Number of children: 2   Tobacco Use    Smoking status: Former     Types: Cigarettes     Quit date: 1964     Years since quittin.0    Smokeless tobacco: Never   Vaping Use    Vaping Use: Never used   Substance and Sexual Activity    Alcohol use: Yes    Drug use: Never    Sexual activity: Defer            No Known Allergies         Current Outpatient Medications on File Prior to Visit   Medication Sig Dispense Refill    B Complex Vitamins (vitamin b complex) capsule capsule Take  by mouth Daily.      Calcium-Magnesium 250-125 MG tablet Take  by mouth 2 (Two) Times a Day.      cholecalciferol (VITAMIN D3) 25 MCG (1000 UT) tablet Take 1 tablet by mouth Daily.      Cyanocobalamin (B-12 PO) Take  by mouth.      doxazosin (CARDURA) 1 MG tablet TAKE 1 TABLET EVERY NIGHT 90 tablet 1    Glycerin PF (Oasis Tears PF) 0.22 % solution Apply  to eye(s) as directed by provider.      meclizine (ANTIVERT) 25 MG tablet Take 1 tablet by mouth 3 (Three) Times a Day As Needed for Dizziness. 30 tablet 0    niacin 500 MG tablet Take 1 tablet by mouth 2 (Two) Times a Day.      potassium chloride (MICRO-K) 10 MEQ CR capsule TAKE 1 CAPSULE EVERY DAY 90 capsule 1    Probiotic Product (PROBIOTIC-10 PO) Take 1 capsule by mouth Daily.      TURMERIC PO Take  by mouth.      [DISCONTINUED] aspirin 81 MG chewable tablet Chew 1 tablet Daily.      vitamin C (ASCORBIC ACID) 250 MG tablet Take 1 tablet by mouth Daily.       Current Facility-Administered Medications on File Prior to Visit   Medication Dose Route Frequency Provider Last Rate Last Admin    denosumab (PROLIA) syringe 60 mg  60 mg Subcutaneous Q6 Months Magali Worrell MD   60 mg at 22 0903    denosumab (PROLIA) syringe 60 mg  60 mg Subcutaneous Q6 Months Magali Worrell MD   60 mg at 23 0906            /68   Pulse 53   Temp 97.6 °F (36.4 °C) (Oral)   Ht 167.6 cm (65.98\")   Wt 66 kg (145 lb 6.4 oz)   SpO2 98% Comment: " ROOM AIR  BMI 23.48 kg/m²          Objective     Physical Exam  Vitals and nursing note reviewed.   Constitutional:       General: He is not in acute distress.     Appearance: Normal appearance.   HENT:      Head: Normocephalic and atraumatic.   Pulmonary:      Effort: Pulmonary effort is normal. No respiratory distress.   Abdominal:      General: Bowel sounds are normal. There is no distension.      Palpations: Abdomen is soft.      Tenderness: There is no abdominal tenderness. There is no guarding or rebound.   Genitourinary:      Skin:     General: Skin is warm and dry.   Neurological:      Mental Status: He is alert and oriented to person, place, and time.   Psychiatric:         Mood and Affect: Mood normal.         Behavior: Behavior normal.             XR Abdomen KUB    Result Date: 12/28/2023  PROCEDURE: XR ABDOMEN KUB  COMPARISON: [place], CT, [facility] PEL W/O CONTRAST, 8/12/2020, 12:53.  INDICATIONS: LOW TO RIGHT SIDE  ABDOMEN PAIN X 2 WEEKS  FINDINGS:  There is calcification overlying the upper pole of the right kidney measuring 0.89 cm.  There are suggested calcifications overlying the left kidney in the upper pole largest measures about 0.59 cm as well as some density in the mid and left lower quadrant of the abdomen that may relate to residue in bowel as opposed to calcifications.  There is a moderate stool burden which could relate to constipation.  There are pelvic calcifications which could relate to phleboliths.  There is deformity of the right hip which may be positional.        1. Intrarenal calculi suggested bilaterally. 2. Moderate stool burden which could relate to constipation. 3. Asymmetry in the appearance of the right hip is compared to the left which could be positional and should be correlated with clinical findings.     CAITLYN MEDEL MD       Electronically Signed and Approved By: CAITLYN MEDEL MD on 12/28/2023 at 10:24                              Diagnoses and all orders  for this visit:    1. RLQ abdominal pain (Primary)  -     XR Abdomen KUB; Future    2. Hernia, inguinal, right  -     US Abdomen Limited; Future  -     US Soft Tissue; Future             Additional Instructions for the Follow-ups that You Need to Schedule       US Abdomen Limited   Jan 02, 2024      Exam reason: RLQ abdominal/groin mass   Release to patient: Routine Release        US Soft Tissue   Jan 11, 2024      Exam reason: right groin mass   Release to patient: Routine Release                          FOR FULL DISCHARGE INSTRUCTIONS/COMMENTS/HANDOUTS please see the   AVS

## 2024-01-17 ENCOUNTER — HOSPITAL ENCOUNTER (OUTPATIENT)
Dept: ULTRASOUND IMAGING | Facility: HOSPITAL | Age: 89
Discharge: HOME | End: 2024-01-17
Payer: MEDICARE

## 2024-01-17 DIAGNOSIS — K40.90 HERNIA, INGUINAL, RIGHT: ICD-10-CM

## 2024-01-17 PROCEDURE — 76999 ECHO EXAMINATION PROCEDURE: CPT

## 2024-01-17 PROCEDURE — 76705 ECHO EXAM OF ABDOMEN: CPT

## 2024-01-22 ENCOUNTER — TELEPHONE (OUTPATIENT)
Dept: FAMILY MEDICINE CLINIC | Age: 89
End: 2024-01-22
Payer: MEDICARE

## 2024-01-22 NOTE — TELEPHONE ENCOUNTER
Caller: Anirudh Lomeli    Relationship: Self    Best call back number: [phone]    What is the best time to reach you: ANYTIME    Who are you requesting to speak with (clinical staff, provider,  specific staff member): NURSE         What was the call regarding: PATIENT IS CALLING NEEDING THE OFFICE NOTES FROM 12/28 AND THE ULTRASOUND WITH RESULTS THAT WS DONE ON 1/17, SENT TO DOCTOR NIYA POSADA - [phone] THESE NEEDS TO BE SENT BY TOMORROW A.M. PATIENT HAS AN APPOINTMENT AT 3:30 TOMORROW.

## 2024-03-05 RX ORDER — DOXAZOSIN MESYLATE 1 MG/1
1 TABLET ORAL NIGHTLY
Qty: 90 TABLET | Refills: 1 | Status: SHIPPED | OUTPATIENT
Start: 2024-03-05

## 2024-03-05 NOTE — TELEPHONE ENCOUNTER
Caller: MARGARET BROWN    Relationship: Emergency Contact    Best call back number: [phone]    Requested Prescriptions:   Requested Prescriptions     Pending Prescriptions Disp Refills    doxazosin (CARDURA) 1 MG tablet 90 tablet 1     Sig: Take 1 tablet by mouth Every Night.        Pharmacy where request should be sent: [facility] - [place] - [address] [facility] AT [address] TO [facility] - [phone]  - [phone] FX     Last office visit with prescribing clinician: 12/13/2023   Last telemedicine visit with prescribing clinician: Visit date not found   Next office visit with prescribing clinician: 6/11/2024     Additional details provided by patient:     Does the patient have less than a 3 day supply:  [x] Yes  [] No    Would you like a call back once the refill request has been completed: [x] Yes [] No    If the office needs to give you a call back, can they leave a voicemail: [x] Yes [] No    Arielle Sanchez Rep   03/05/24 11:35 EST

## 2024-03-26 ENCOUNTER — OFFICE VISIT (OUTPATIENT)
Dept: FAMILY MEDICINE CLINIC | Age: 89
End: 2024-03-26
Payer: MEDICARE

## 2024-03-26 VITALS
TEMPERATURE: 97.8 F | WEIGHT: 145 LBS | HEIGHT: 66 IN | OXYGEN SATURATION: 97 % | BODY MASS INDEX: 23.3 KG/M2 | SYSTOLIC BLOOD PRESSURE: 130 MMHG | DIASTOLIC BLOOD PRESSURE: 65 MMHG | HEART RATE: 55 BPM

## 2024-03-26 DIAGNOSIS — G50.0 TRIGEMINAL NEURALGIA OF LEFT SIDE OF FACE: Primary | ICD-10-CM

## 2024-03-26 PROCEDURE — 99213 OFFICE O/P EST LOW 20 MIN: CPT | Performed by: PHYSICIAN ASSISTANT

## 2024-03-26 PROCEDURE — 1160F RVW MEDS BY RX/DR IN RCRD: CPT | Performed by: PHYSICIAN ASSISTANT

## 2024-03-26 PROCEDURE — 1159F MED LIST DOCD IN RCRD: CPT | Performed by: PHYSICIAN ASSISTANT

## 2024-03-26 RX ORDER — CARBAMAZEPINE 100 MG/1
100 CAPSULE, EXTENDED RELEASE ORAL 2 TIMES DAILY
Qty: 60 CAPSULE | Refills: 0 | Status: SHIPPED | OUTPATIENT
Start: 2024-03-26

## 2024-03-26 RX ORDER — POTASSIUM CHLORIDE 750 MG/1
10 CAPSULE, EXTENDED RELEASE ORAL DAILY
Qty: 90 CAPSULE | Refills: 1 | Status: SHIPPED | OUTPATIENT
Start: 2024-03-26

## 2024-03-26 NOTE — PROGRESS NOTES
Subjective     CHIEF COMPLAINT    Chief Complaint   Patient presents with    Nasal Congestion     X 3-4 days. Pt declines testing today.     Earache     (L) pt c/o sharp pains. Onset last night.     Eye Pain     (L) x 2 days.             History of Present Illness  This is an 88-year-old male presenting to the clinic with left-sided rhinorrhea, itchy and watery left eye, and left ear pain for the last 2 to 3 days.  He reports his symptoms initially began with his left eye itching and clear rhinorrhea.  They have progressed to include the sporadic sharp pains radiating from his ear and into his eye.  He states these occur about every 15 seconds and are very brief, however his wife states he is visibly jumping each time they occur so his pain is quite significant.  He denies any history of similar symptoms before and does not have lingering pain between these sharp jabs.  He does not have any weakness, numbness or speech difficulty.  He has been feeling well otherwise.            Review of Systems   Constitutional:  Negative for chills, fatigue and fever.   HENT:  Positive for ear pain and rhinorrhea. Negative for sore throat.    Eyes:  Positive for discharge, redness and itching.   Respiratory:  Negative for cough, shortness of breath and wheezing.    Cardiovascular:  Negative for chest pain.   Neurological:  Negative for speech difficulty, weakness, numbness and headaches.            Past Medical History:   Diagnosis Date    Dizziness     HL (hearing loss)     Hyperlipidemia     Hypertension     Hypoxia, sleep related     Low back pain     Osteoporosis     Postpolio syndrome     Sleep apnea             Past Surgical History:   Procedure Laterality Date    ANKLE FUSION Left     BLEPHAROPLASTY Bilateral     upper lids    CHOLECYSTECTOMY  1984            Family History   Problem Relation Age of Onset    Stroke Father     Coronary artery disease Brother     Hyperlipidemia Brother             Social History      Socioeconomic History    Marital status:     Number of children: 2   Tobacco Use    Smoking status: Former     Current packs/day: 0.00     Types: Cigarettes     Quit date: 1964     Years since quittin.2    Smokeless tobacco: Never   Vaping Use    Vaping status: Never Used   Substance and Sexual Activity    Alcohol use: Yes    Drug use: Never    Sexual activity: Defer            No Known Allergies         Current Outpatient Medications on File Prior to Visit   Medication Sig Dispense Refill    B Complex Vitamins (vitamin b complex) capsule capsule Take  by mouth Daily.      Calcium-Magnesium 250-125 MG tablet Take  by mouth 2 (Two) Times a Day.      cholecalciferol (VITAMIN D3) 25 MCG (1000 UT) tablet Take 1 tablet by mouth Daily.      Cyanocobalamin (B-12 PO) Take  by mouth.      doxazosin (CARDURA) 1 MG tablet Take 1 tablet by mouth Every Night. 90 tablet 1    Glycerin PF (Oasis Tears PF) 0.22 % solution Apply  to eye(s) as directed by provider.      meclizine (ANTIVERT) 25 MG tablet Take 1 tablet by mouth 3 (Three) Times a Day As Needed for Dizziness. 30 tablet 0    niacin 500 MG tablet Take 1 tablet by mouth 2 (Two) Times a Day.      Probiotic Product (PROBIOTIC-10 PO) Take 1 capsule by mouth Daily.      vitamin C (ASCORBIC ACID) 250 MG tablet Take 1 tablet by mouth Daily.      [DISCONTINUED] potassium chloride (MICRO-K) 10 MEQ CR capsule TAKE 1 CAPSULE EVERY DAY 90 capsule 1    TURMERIC PO Take  by mouth. (Patient not taking: Reported on 3/26/2024)       Current Facility-Administered Medications on File Prior to Visit   Medication Dose Route Frequency Provider Last Rate Last Admin    denosumab (PROLIA) syringe 60 mg  60 mg Subcutaneous Q6 Months Magali Worrell MD   60 mg at 22 0903    denosumab (PROLIA) syringe 60 mg  60 mg Subcutaneous Q6 Months Magali Worrell MD   60 mg at 23 0906            /65   Pulse 55   Temp 97.8 °F (36.6 °C) (Oral)   Ht 167.6 cm  "(65.98\")   Wt 65.8 kg (145 lb)   SpO2 97% Comment: room air  BMI 23.41 kg/m²          Objective     Physical Exam  Vitals and nursing note reviewed.   Constitutional:       General: He is not in acute distress.     Appearance: Normal appearance.   HENT:      Head: Normocephalic and atraumatic.      Jaw: There is normal jaw occlusion. No tenderness or pain on movement (No jaw claudication).      Right Ear: Tympanic membrane, ear canal and external ear normal.      Left Ear: Tympanic membrane, ear canal and external ear normal.      Ears:      Comments: No vesicles noted.     Nose: Rhinorrhea (clear rhinorrhea left nostril) present.      Mouth/Throat:      Mouth: Mucous membranes are moist.      Pharynx: Oropharynx is clear. Uvula midline.      Comments: Uvula midline.   Eyes:      General: Vision grossly intact. Gaze aligned appropriately. No scleral icterus.        Left eye: Discharge (clear drainage) present.     Extraocular Movements: Extraocular movements intact.      Conjunctiva/sclera:      Left eye: Left conjunctiva is injected. No chemosis, exudate or hemorrhage.     Pupils: Pupils are equal, round, and reactive to light.      Comments: Slight left upper eyelid droop - chronic per patient, has had plastic surgery for this.    Cardiovascular:      Rate and Rhythm: Normal rate and regular rhythm.      Heart sounds: Normal heart sounds.   Pulmonary:      Effort: Pulmonary effort is normal. No respiratory distress.      Breath sounds: Normal breath sounds.   Skin:     General: Skin is warm and dry.      Findings: No rash.   Neurological:      Mental Status: He is alert and oriented to person, place, and time.   Psychiatric:         Mood and Affect: Mood normal.         Behavior: Behavior normal.          Assessment & Plan  Trigeminal neuralgia of left side of face  I reviewed Anirudh's presentation with Dr. Worrell.  Neurologically he is stable without evidence of acute intracranial pathology.  The sharp jolts of " pain could be consistent with trigeminal neuralgia, but the autonomic symptoms of rhinorrhea and conjunctivitis are more perplexing.  Will start with carbamazepine 100 mg twice daily for diagnosis of trigeminal neuralgia.  Have scheduled close follow-up with PCP Dr. Worrell to evaluate response.  Anirudh and his wife were advised to go to the emergency department if his symptoms worsen or if he develops any strokelike symptoms including facial droop, difficulty speaking, weakness or numbness on one side of his body.     New Medications Ordered This Visit   Medications    carBAMazepine (Carbatrol) 100 MG 12 hr capsule     Sig: Take 1 capsule by mouth 2 (Two) Times a Day.     Dispense:  60 capsule     Refill:  0                FOR FULL DISCHARGE INSTRUCTIONS/COMMENTS/HANDOUTS please see the   AVS

## 2024-03-29 ENCOUNTER — TELEPHONE (OUTPATIENT)
Dept: FAMILY MEDICINE CLINIC | Age: 89
End: 2024-03-29
Payer: MEDICARE

## 2024-03-29 ENCOUNTER — HOSPITAL ENCOUNTER (OUTPATIENT)
Dept: OTHER | Facility: HOSPITAL | Age: 89
Discharge: HOME | End: 2024-03-29

## 2024-03-29 NOTE — TELEPHONE ENCOUNTER
Caller: MARGARET BROWN    Relationship: Emergency Contact    Best call back number: [phone]     What orders are you requesting (i.e. lab or imaging): MRI OF THE BRAIN    In what timeframe would the patient need to come in: ASAP    Where will you receive your lab/imaging services: [facility]    Additional notes: MARGARET STATES THE PATIENT WAS RELEASED FROM [facility] ER 3/29/24, AND THEY RECCOMMENDED HE REQUEST AN MRI OF THE BRAIN FROM HIS PCP.

## 2024-03-29 NOTE — TELEPHONE ENCOUNTER
Pt's wife called wanting to know what else he can take with the Carbatrol?  It does not seem to last very long.  Please advise.

## 2024-03-29 NOTE — TELEPHONE ENCOUNTER
Caller: MARGARET BROWN    Relationship: Emergency Contact    Best call back number: [phone]     What is the medical concern/diagnosis: TRIGEMINAL NEURALGIA PAIN    What specialty or service is being requested: NEUROLOGY    What is the office location: [facility] additional details: MARGARET STATES [facility] RECOMMENDED THE PATIENT SEE A NEUROLOGISTS FOR HIS ISSUES.

## 2024-04-05 ENCOUNTER — TELEMEDICINE (OUTPATIENT)
Dept: FAMILY MEDICINE CLINIC | Age: 89
End: 2024-04-05
Payer: MEDICARE

## 2024-04-05 ENCOUNTER — TELEPHONE (OUTPATIENT)
Dept: FAMILY MEDICINE CLINIC | Age: 89
End: 2024-04-05
Payer: MEDICARE

## 2024-04-05 DIAGNOSIS — B02.30 HERPES ZOSTER WITH OPHTHALMIC COMPLICATION, UNSPECIFIED HERPES ZOSTER EYE DISEASE: Primary | ICD-10-CM

## 2024-04-05 PROCEDURE — 1160F RVW MEDS BY RX/DR IN RCRD: CPT | Performed by: FAMILY MEDICINE

## 2024-04-05 PROCEDURE — 99213 OFFICE O/P EST LOW 20 MIN: CPT | Performed by: FAMILY MEDICINE

## 2024-04-05 PROCEDURE — 1159F MED LIST DOCD IN RCRD: CPT | Performed by: FAMILY MEDICINE

## 2024-04-05 RX ORDER — PREDNISOLONE ACETATE 10 MG/ML
2 SUSPENSION/ DROPS OPHTHALMIC 4 TIMES DAILY
COMMUNITY
Start: 2024-03-27

## 2024-04-05 RX ORDER — HYDROCODONE BITARTRATE AND ACETAMINOPHEN 5; 325 MG/1; MG/1
.5-1 TABLET ORAL EVERY 6 HOURS PRN
Qty: 12 TABLET | Refills: 0 | Status: SHIPPED | OUTPATIENT
Start: 2024-04-05

## 2024-04-05 RX ORDER — VALACYCLOVIR HYDROCHLORIDE 1 G/1
1000 TABLET, FILM COATED ORAL 3 TIMES DAILY
COMMUNITY
Start: 2024-04-01 | End: 2024-04-08

## 2024-04-05 NOTE — PROGRESS NOTES
Anirudh Lomeli presents to [facility].    Chief Complaint:  Shingles pain    TELEHEALTH VISIT:   - Anirudh consented to this telehealth visit.   - Persons on the call include:  patient - Anirudh, his wife - Dr. Michael Wright   - The patient is at home.  I am at the office.   - This visit is being conducted over Doximity with audio and video capability.   - This visit is being done remotely for patient convenience and compliance.    Subjective   History of Present Illness:  Anirudh is being seen today for follow-up on shingles pain.  He was diagnosed with it early this week.  He had a significant outbreak of shingles in the area around the left eye and forehead.  He has already been seen by ophthalmology and been placed on appropriate treatments.  He will be following up with them early this week, but he is having significant pain.  He is just about out of hydrocodone and would like to get a refill on that.  He has tolerated it well so far.    Review of Systems:  Review of Systems   Constitutional:  Negative for chills and fever.   Respiratory:  Negative for cough and shortness of breath.    Cardiovascular:  Negative for chest pain and palpitations.   Gastrointestinal:  Negative for abdominal pain, nausea and vomiting.      Objective   Medical History:  Past Medical History:    Dizziness    HL (hearing loss)    Hyperlipidemia    Hypertension    Hypoxia, sleep related    Low back pain    Osteoporosis    Postpolio syndrome    Sleep apnea     Past Surgical History:    ANKLE FUSION    BLEPHAROPLASTY    upper lids    CHOLECYSTECTOMY      Family History   Problem Relation Age of Onset    Stroke Father     Coronary artery disease Brother     Hyperlipidemia Brother      Social History     Tobacco Use    Smoking status: Former     Current packs/day: 0.00     Types: Cigarettes     Quit date:      Years since quittin.3    Smokeless tobacco: Never   Substance Use Topics    Alcohol use: Yes        Health Maintenance Due   Topic Date Due    TDAP/TD VACCINES (1 - Tdap) Never done    ZOSTER VACCINE (1 of 2) Never done    RSV Vaccine - Adults (1 - 1-dose 60+ series) Never done        Immunization History   Administered Date(s) Administered    COVID-19 (MODERNA) 1st,2nd,3rd Dose Monovalent 02/23/2021, 03/24/2021, 10/30/2021    COVID-19 F23 (PFIZER) 12YRS+ (COMIRNATY) 12/13/2023    Fluzone High-Dose 65+yrs 09/29/2021, 10/18/2022, 11/10/2023    Influenza, Unspecified 10/01/2017       No Known Allergies     Medications:  Current Outpatient Medications on File Prior to Visit   Medication Sig    B Complex Vitamins (vitamin b complex) capsule capsule Take  by mouth Daily.    Calcium-Magnesium 250-125 MG tablet Take  by mouth 2 (Two) Times a Day.    cholecalciferol (VITAMIN D3) 25 MCG (1000 UT) tablet Take 1 tablet by mouth Daily.    Cyanocobalamin (B-12 PO) Take  by mouth.    doxazosin (CARDURA) 1 MG tablet Take 1 tablet by mouth Every Night.    Glycerin PF (Oasis Tears PF) 0.22 % solution Apply  to eye(s) as directed by provider.    meclizine (ANTIVERT) 25 MG tablet Take 1 tablet by mouth 3 (Three) Times a Day As Needed for Dizziness.    niacin 500 MG tablet Take 1 tablet by mouth 2 (Two) Times a Day.    potassium chloride (MICRO-K) 10 MEQ CR capsule Take 1 capsule by mouth Daily.    prednisoLONE acetate (PRED FORTE) 1 % ophthalmic suspension Administer 2 drops into the left eye 4 (Four) Times a Day.    Probiotic Product (PROBIOTIC-10 PO) Take 1 capsule by mouth Daily.    valACYclovir (VALTREX) 1000 MG tablet Take 1 tablet by mouth 3 (Three) Times a Day.    vitamin C (ASCORBIC ACID) 250 MG tablet Take 1 tablet by mouth Daily.    [DISCONTINUED] carBAMazepine (Carbatrol) 100 MG 12 hr capsule Take 1 capsule by mouth 2 (Two) Times a Day.    [DISCONTINUED] TURMERIC PO Take  by mouth. (Patient not taking: Reported on 3/26/2024)     Current Facility-Administered Medications on File Prior to Visit   Medication     "denosumab (PROLIA) syringe 60 mg    denosumab (PROLIA) syringe 60 mg       Vital Signs:   There were no vitals taken for this visit.      Physical Exam:  Physical Exam  Vitals reviewed.   Constitutional:       General: He is not in acute distress.     Appearance: He is not ill-appearing.   Eyes:      General:         Left eye: Discharge present.  Cardiovascular:      Rate and Rhythm: Normal rate and regular rhythm.   Pulmonary:      Effort: Pulmonary effort is normal.      Breath sounds: Normal breath sounds.   Abdominal:      General: There is no distension.      Palpations: Abdomen is soft.      Tenderness: There is no abdominal tenderness.   Skin:     Findings: No rash (There is a blistered rash noted on the skin in the area around the left eye.  This involves the left forehead.).   Neurological:      Mental Status: He is alert.         Result Review   The following data was reviewed by Corbin Rodriguez MD on 04/05/2024.  Lab Results   Component Value Date    WBC 4.28 06/12/2023    HGB 14.2 06/12/2023    HCT 40.8 06/12/2023    .2 (H) 06/12/2023     06/12/2023     Lab Results   Component Value Date    GLUCOSE 109 (H) 06/12/2023    BUN 15 06/12/2023    CREATININE 0.92 06/12/2023     06/12/2023    K 3.8 06/12/2023     06/12/2023    CO2 30.0 (H) 06/12/2023    CALCIUM 8.8 06/12/2023    PROTEINTOT 6.3 06/12/2023    ALBUMIN 3.9 06/12/2023    ALT 15 06/12/2023    AST 24 06/12/2023    ALKPHOS 91 06/12/2023    BILITOT 0.4 06/12/2023    EGFR 80.5 06/12/2023    GLOB 2.4 06/12/2023    AGRATIO 1.6 06/12/2023    BCR 16.3 06/12/2023    ANIONGAP 9.0 06/12/2023      Lab Results   Component Value Date    CHOL 155 07/27/2022    TRIG 56 07/27/2022    HDL 75 (H) 07/27/2022    LDL 68 07/27/2022     No results found for: \"TSH\"  Lab Results   Component Value Date    HGBA1C 5.70 (H) 06/12/2023     Lab Results   Component Value Date    PSA 1.82 08/10/2020     BMI is within normal parameters. No other " follow-up for BMI required.         Assessment and Plan:   Today, we have reviewed his care.  He continues to be in significant pain related to the outbreak of shingles.  He has scheduled follow-up with ophthalmology.  After talking with him and his wife, I have sent a refill on hydrocodone.  He has tolerated it well so far.  We discussed potential risks of the medication including impairment.  We will reach out to him on Monday for verbal consent, but they are agreeable to him taking the medication as needed.    Diagnoses and all orders for this visit:    1. Herpes zoster with ophthalmic complication, unspecified herpes zoster eye disease (Primary)  -     HYDROcodone-acetaminophen (NORCO) 5-325 MG per tablet; Take 0.5-1 tablets by mouth Every 6 (Six) Hours As Needed for Moderate Pain.  Dispense: 12 tablet; Refill: 0    Follow Up  Return if symptoms worsen or fail to improve.  Patient was given instructions and counseling regarding his condition or for health maintenance advice. Please see specific information pulled into the AVS if appropriate.

## 2024-04-05 NOTE — TELEPHONE ENCOUNTER
Wife states patient has shingles in his eye. In pain, seen eye doctor  and was given eye drops and was given a shot in the eye for the shingles. Wife wanted to see if you could prescribe pain medicine for him. Does not go back to eye doctor until 4/8/24. ER discharge notes in chart       On call

## 2024-04-05 NOTE — TELEPHONE ENCOUNTER
If they want to be worked in late this afternoon, I would be willing to see him.  In that case, we could do telehealth visit if they are able to do so.  Thanks.

## 2024-04-05 NOTE — Clinical Note
I had ask for verbal consent to be obtained for hydrocodone.  I do not think that was done on Friday.  Please call him.  Thanks.

## 2024-04-05 NOTE — TELEPHONE ENCOUNTER
Caller: MARGARET BROWN    Relationship: Emergency Contact    Best call back number: [phone]    What medication are you requesting: HYDROCODONE 5-325MG    What are your current symptoms: N/A    How long have you been experiencing symptoms: N/A    Have you had these symptoms before:    [] Yes  [] No    Have you been treated for these symptoms before:   [] Yes  [] No    If a prescription is needed, what is your preferred pharmacy and phone number: [facility] [id number] - [place], [place] - [address] [facility] [phone] [facility] [phone] FX     Additional notes:PATIENT WAS SEEN FOR SHINGLES IN HIS EYE. HE WILL NOT BE ABLE TO SEE OPHTHALMOLOGIST UNTIL MONDAY. HE HAS ONE PILL LEFT FOR PAIN. PLEASE SEND NEW SCRIPT TO PHARMACY ASAP AS PATIENT IS IN SIGNIFICANT PAIN.

## 2024-04-09 ENCOUNTER — OFFICE VISIT (OUTPATIENT)
Dept: FAMILY MEDICINE CLINIC | Age: 89
End: 2024-04-09
Payer: MEDICARE

## 2024-04-09 VITALS
HEART RATE: 58 BPM | WEIGHT: 137.2 LBS | OXYGEN SATURATION: 98 % | SYSTOLIC BLOOD PRESSURE: 136 MMHG | BODY MASS INDEX: 22.05 KG/M2 | HEIGHT: 66 IN | TEMPERATURE: 98.5 F | DIASTOLIC BLOOD PRESSURE: 55 MMHG

## 2024-04-09 DIAGNOSIS — B02.30 HERPES ZOSTER OPHTHALMICUS OF LEFT EYE: Primary | ICD-10-CM

## 2024-04-09 PROBLEM — Z00.00 ANNUAL PHYSICAL EXAM: Status: RESOLVED | Noted: 2021-12-03 | Resolved: 2024-04-09

## 2024-04-09 PROCEDURE — 1159F MED LIST DOCD IN RCRD: CPT | Performed by: FAMILY MEDICINE

## 2024-04-09 PROCEDURE — 1160F RVW MEDS BY RX/DR IN RCRD: CPT | Performed by: FAMILY MEDICINE

## 2024-04-09 PROCEDURE — 99213 OFFICE O/P EST LOW 20 MIN: CPT | Performed by: FAMILY MEDICINE

## 2024-04-09 PROCEDURE — G2211 COMPLEX E/M VISIT ADD ON: HCPCS | Performed by: FAMILY MEDICINE

## 2024-04-09 NOTE — PROGRESS NOTES
Chief Complaint  Headache (Follow up ) and Herpes Zoster (Shingles in the eye and across the head 4.1.2024 )    Subjective          Anirudh Lomeli presents to [facility] today for follow-up on head pain.    He was initially seen by Yola Chandler on 3/26/2024 in clinic with a chief complaint of left-sided head/ear pain associated with left-sided rhinorrhea and watery eye of 2 to 3 days duration.  There was concern that this was consistent with trigeminal neuralgia and he was therefore started on carbamazepine 100 mg twice daily.  He did have some concerns about taking it, however and so stopped after 1 dose.  Unfortunately, pain increased.  He presented to the [facility] ED 3 days later on 3/29.  There, cluster headache versus trigeminal neuralgia was on the differential.  It was recommended that he follow back up here for referral to Neurology and as well as MRI brain.  He did see the eye doctor as well who prescribed steroid eyedrops.  Rash over the L upper face/scalp sparing the midline developed on Easter Sunday  He returned to the ED on 4/1 and at that time was noted to have new development of a rash with redness and swelling around the left eye associated with throbbing 10 out of 10 left eye pain and blurred vision.  He was diagnosed with herpes zoster ophthalmicus and treated with acyclovir and as well as tetracaine eyedrops and Norco/Lidoderm patch for rash pain.  He was referred urgently to Ophthalmology and saw them last week.  He was seen by Dr. Rodriguez in our office on 4/5 to follow-up on pain meds.  He received #12 Norco 5/325 mg at that time.    Today, the rash is scabbed over.  He is no longer requiring the Norco (just Tylenol) but he does report that he is having trouble sleeping at night due to the pain.  He is going back to see Dr. Juarez at [place] [facility] [place] next Monday.       Current Outpatient Medications:     B Complex Vitamins (vitamin b complex) capsule capsule, Take   by mouth Daily., Disp: , Rfl:     Calcium-Magnesium 250-125 MG tablet, Take  by mouth 2 (Two) Times a Day., Disp: , Rfl:     cholecalciferol (VITAMIN D3) 25 MCG (1000 UT) tablet, Take 1 tablet by mouth Daily., Disp: , Rfl:     Cyanocobalamin (B-12 PO), Take  by mouth., Disp: , Rfl:     doxazosin (CARDURA) 1 MG tablet, Take 1 tablet by mouth Every Night., Disp: 90 tablet, Rfl: 1    Glycerin PF (Oasis Tears PF) 0.22 % solution, Apply  to eye(s) as directed by provider., Disp: , Rfl:     meclizine (ANTIVERT) 25 MG tablet, Take 1 tablet by mouth 3 (Three) Times a Day As Needed for Dizziness., Disp: 30 tablet, Rfl: 0    niacin 500 MG tablet, Take 1 tablet by mouth 2 (Two) Times a Day., Disp: , Rfl:     potassium chloride (MICRO-K) 10 MEQ CR capsule, Take 1 capsule by mouth Daily., Disp: 90 capsule, Rfl: 1    prednisoLONE acetate (PRED FORTE) 1 % ophthalmic suspension, Administer 1 drop into the left eye 4 (Four) Times a Day., Disp: , Rfl:     Probiotic Product (PROBIOTIC-10 PO), Take 1 capsule by mouth Daily., Disp: , Rfl:     valACYclovir (VALTREX) 1000 MG tablet, Take 1 tablet by mouth As Needed., Disp: , Rfl:     vitamin C (ASCORBIC ACID) 250 MG tablet, Take 1 tablet by mouth Daily., Disp: , Rfl:     HYDROcodone-acetaminophen (NORCO) 5-325 MG per tablet, Take 0.5-1 tablets by mouth Every 6 (Six) Hours As Needed for Moderate Pain. (Patient not taking: Reported on 4/9/2024), Disp: 12 tablet, Rfl: 0    Current Facility-Administered Medications:     denosumab (PROLIA) syringe 60 mg, 60 mg, Subcutaneous, Q6 Months, Magali Worrell MD, 60 mg at 03/22/22 0903    denosumab (PROLIA) syringe 60 mg, 60 mg, Subcutaneous, Q6 Months, Magali Worrell MD, 60 mg at 11/02/23 0906  There are no discontinued medications.      Allergies:  Patient has no known allergies.      Objective   Vital Signs:   Vitals:    04/09/24 1337   BP: 136/55   BP Location: Left arm   Patient Position: Sitting   Cuff Size: Adult   Pulse: 58  "  Temp: 98.5 °F (36.9 °C)   TempSrc: Oral   SpO2: 98%   Weight: 62.2 kg (137 lb 3.2 oz)   Height: 167.6 cm (65.98\")     Body mass index is 22.16 kg/m².  BMI is within normal parameters. No other follow-up for BMI required.        Physical Exam  Vitals reviewed.   Constitutional:       General: He is not in acute distress.     Appearance: Normal appearance. He is well-developed.   HENT:      Head: Normocephalic and atraumatic.      Right Ear: External ear normal.      Left Ear: External ear normal.   Eyes:      Extraocular Movements: Extraocular movements intact.      Conjunctiva/sclera: Conjunctivae normal.      Pupils: Pupils are equal, round, and reactive to light.   Cardiovascular:      Rate and Rhythm: Normal rate and regular rhythm.      Heart sounds: No murmur heard.  Pulmonary:      Effort: Pulmonary effort is normal. No respiratory distress.      Breath sounds: Normal breath sounds. No wheezing, rhonchi or rales.   Abdominal:      General: Bowel sounds are normal. There is no distension.      Palpations: Abdomen is soft.      Tenderness: There is no abdominal tenderness.   Musculoskeletal:         General: Normal range of motion.   Skin:     General: Skin is warm and dry.      Findings: Rash (scabbed coalescing vesicles over the left nose and forehead with pronounced sparing of the midline) present.   Neurological:      General: No focal deficit present.      Mental Status: He is alert and oriented to person, place, and time.   Psychiatric:         Mood and Affect: Mood and affect normal.         Behavior: Behavior normal.         Thought Content: Thought content normal.         Judgment: Judgment normal.           Lab Results   Component Value Date    GLUCOSE 109 (H) 06/12/2023    BUN 15 06/12/2023    CREATININE 0.92 06/12/2023    EGFRIFNONA 87 12/03/2021    EGFRIFAFRI 88 07/20/2018    BCR 16.3 06/12/2023    K 3.8 06/12/2023    CO2 30.0 (H) 06/12/2023    CALCIUM 8.8 06/12/2023    ALBUMIN 3.9 06/12/2023    " LABIL2 1.3 (L) 02/05/2021    AST 24 06/12/2023    ALT 15 06/12/2023       Lab Results   Component Value Date    CHOL 155 07/27/2022    TRIG 56 07/27/2022    HDL 75 (H) 07/27/2022    LDL 68 07/27/2022       Lab Results   Component Value Date    WBC 4.28 06/12/2023    HGB 14.2 06/12/2023    HCT 40.8 06/12/2023    .2 (H) 06/12/2023     06/12/2023            Assessment and Plan    Assessment & Plan  Herpes zoster ophthalmicus of left eye  Healing herpes zoster of the left T1 distribution, involving the left eye.  He is already established with Ophthalmology and has been following with them over at [facility].  He goes back to see them on Monday.  He has completed his course of valacyclovir.  No longer requiring Norco during the day but he is having quite a bit of trouble sleeping at night due to the ongoing pain.  I am recommending that he continue to use half a tab to 1 tablet of Norco at bedtime at least.  Continue eyedrops as prescribed by Ophthalmology.              Follow Up   Return for or as scheduled 6/11/2024.  Patient was given instructions and counseling regarding his condition or for health maintenance advice. Please see specific information pulled into the AVS if appropriate.           04/09/2024

## 2024-04-12 ENCOUNTER — TELEPHONE (OUTPATIENT)
Dept: FAMILY MEDICINE CLINIC | Age: 89
End: 2024-04-12
Payer: MEDICARE

## 2024-04-12 DIAGNOSIS — B02.30 HERPES ZOSTER OPHTHALMICUS OF LEFT EYE: Primary | ICD-10-CM

## 2024-04-12 RX ORDER — GABAPENTIN 300 MG/1
300 CAPSULE ORAL 2 TIMES DAILY PRN
Qty: 60 CAPSULE | Refills: 0 | Status: SHIPPED | OUTPATIENT
Start: 2024-04-12

## 2024-04-12 NOTE — TELEPHONE ENCOUNTER
It looks like Dr. Coon sent in gabapentin 300 mg twice daily.  I can certainly get him some more of that and we will send in a prescription to the pharmacy.  Please let me know if they have any questions or concerns.

## 2024-04-12 NOTE — TELEPHONE ENCOUNTER
Caller: MARGARET BROWN    Relationship: Emergency Contact    Best call back number: [phone]    What medication are you requesting: GABAPENTIN    What are your current symptoms: SHOOTING PAIN IN EYES  AND HEAD FROM SHINGLES        If a prescription is needed, what is your preferred pharmacy and phone number: [facility] [id number] - [place], [place] - [address] [facility] - [phone]  - [phone] FX     Additional notes:     THE PATIENT'S WIFE SAID THAT THE OPTOMETRIST ADVISED TO CALL HIS PCP TO GET MORE THAN A  3 DAYS SUPPLY FOR THE GABAPENTIN.SHE SAID THEY ARE ONLY ALLOWED TO PRESCRIBED 3 DAYS. SHE IS NOT SURE HOW MUCH IS NEEDED.       PLEASE ADVISE PATIENT

## 2024-04-17 ENCOUNTER — TELEPHONE (OUTPATIENT)
Dept: FAMILY MEDICINE CLINIC | Age: 89
End: 2024-04-17
Payer: MEDICARE

## 2024-04-17 NOTE — TELEPHONE ENCOUNTER
Pt due for Prolia 5/2/24    Last Dexa -2.3  DEXA Bone Density Axial (07/11/2023 11:30)     Last CMP- Calcium 8.3  BASIC METABOLIC PANEL (03/29/2024 10:49)     Last office Visit   Office Visit with Magali Worrell MD (04/09/2024)   Office Visit with Magali Worrell MD (12/13/2023)

## 2024-04-17 NOTE — TELEPHONE ENCOUNTER
Please call Anirudh.  His last calcium level was a little low.  Has he been taking his calcium supplement regularly?  I would like him to double the dose for the next 2 weeks and then we will recheck a BMP at the end of that time (recall placed) to see if the calcium level has improved.  We want to make sure his calcium level is well into the normal range when he takes the Prolia as it can cause a transient drop in calcium.  Holding Prolia until we get the calcium level straightened out.  Thanks, BZBRADFORD

## 2024-05-02 ENCOUNTER — TELEPHONE (OUTPATIENT)
Dept: FAMILY MEDICINE CLINIC | Age: 89
End: 2024-05-02
Payer: MEDICARE

## 2024-05-02 ENCOUNTER — LAB (OUTPATIENT)
Dept: LAB | Facility: HOSPITAL | Age: 89
End: 2024-05-02
Payer: MEDICARE

## 2024-05-02 DIAGNOSIS — E83.51 HYPOCALCEMIA: Primary | ICD-10-CM

## 2024-05-02 DIAGNOSIS — E83.51 HYPOCALCEMIA: ICD-10-CM

## 2024-05-02 LAB
ANION GAP SERPL CALCULATED.3IONS-SCNC: 8 MMOL/L (ref 5–15)
BUN SERPL-MCNC: 18 MG/DL (ref 8–23)
BUN/CREAT SERPL: 21.2 (ref 7–25)
CALCIUM SPEC-SCNC: 9 MG/DL (ref 8.6–10.5)
CHLORIDE SERPL-SCNC: 105 MMOL/L (ref 98–107)
CO2 SERPL-SCNC: 29 MMOL/L (ref 22–29)
CREAT SERPL-MCNC: 0.85 MG/DL (ref 0.76–1.27)
EGFRCR SERPLBLD CKD-EPI 2021: 83.6 ML/MIN/1.73
GLUCOSE SERPL-MCNC: 114 MG/DL (ref 65–99)
POTASSIUM SERPL-SCNC: 4 MMOL/L (ref 3.5–5.2)
SODIUM SERPL-SCNC: 142 MMOL/L (ref 136–145)

## 2024-05-02 PROCEDURE — 80048 BASIC METABOLIC PNL TOTAL CA: CPT

## 2024-05-02 PROCEDURE — 36415 COLL VENOUS BLD VENIPUNCTURE: CPT

## 2024-05-02 NOTE — TELEPHONE ENCOUNTER
----- Message from Carey SPRAGUE sent at 4/17/2024  1:33 PM EDT -----  Please call to have pt come in for labs (BMP, dx hypocalcemia).  Please pend order to me.  Thanks, CHAYITO

## 2024-05-07 DIAGNOSIS — B02.30 HERPES ZOSTER WITH OPHTHALMIC COMPLICATION, UNSPECIFIED HERPES ZOSTER EYE DISEASE: ICD-10-CM

## 2024-05-07 RX ORDER — HYDROCODONE BITARTRATE AND ACETAMINOPHEN 5; 325 MG/1; MG/1
.5-1 TABLET ORAL EVERY 6 HOURS PRN
Qty: 12 TABLET | Refills: 0 | Status: SHIPPED | OUTPATIENT
Start: 2024-05-07

## 2024-05-08 ENCOUNTER — TELEPHONE (OUTPATIENT)
Dept: FAMILY MEDICINE CLINIC | Age: 89
End: 2024-05-08
Payer: MEDICARE

## 2024-05-15 ENCOUNTER — CLINICAL SUPPORT (OUTPATIENT)
Dept: FAMILY MEDICINE CLINIC | Age: 89
End: 2024-05-15
Payer: MEDICARE

## 2024-05-15 DIAGNOSIS — M81.0 AGE-RELATED OSTEOPOROSIS WITHOUT CURRENT PATHOLOGICAL FRACTURE: Primary | ICD-10-CM

## 2024-05-15 PROCEDURE — 96372 THER/PROPH/DIAG INJ SC/IM: CPT | Performed by: FAMILY MEDICINE

## 2024-05-17 DIAGNOSIS — B02.30 HERPES ZOSTER OPHTHALMICUS OF LEFT EYE: ICD-10-CM

## 2024-05-17 RX ORDER — GABAPENTIN 300 MG/1
300 CAPSULE ORAL 2 TIMES DAILY PRN
Qty: 60 CAPSULE | Refills: 0 | Status: SHIPPED | OUTPATIENT
Start: 2024-05-17

## 2024-05-17 NOTE — TELEPHONE ENCOUNTER
Caller: MARGARET BROWN    Relationship: Emergency Contact    Best call back number: [phone] (Mobile)     Requested Prescriptions:   Requested Prescriptions     Pending Prescriptions Disp Refills    gabapentin (NEURONTIN) 300 MG capsule 60 capsule 0     Sig: Take 1 capsule by mouth 2 (Two) Times a Day As Needed (shingles pain).        Pharmacy where request should be sent:  [facility] [id number] [facility] [place] - [address] [facility] [phone] [facility] [phone]  [phone]     Last office visit with prescribing clinician: 4/9/2024   Last telemedicine visit with prescribing clinician: 4/5/2024   Next office visit with prescribing clinician: 6/11/2024     Additional details provided by patient: PATIENT IS OUT OF MEDICATION AND IS STILL HAVING A LOT OF PAIN    Does the patient have less than a 3 day supply:  [x] Yes  [] No    Would you like a call back once the refill request has been completed: [] Yes [] No    If the office needs to give you a call back, can they leave a voicemail: [] Yes [] No    Arielle Felix Rep   05/17/24 15:47 EDT

## 2024-06-04 ENCOUNTER — TRANSCRIBE ORDERS (OUTPATIENT)
Dept: ADMINISTRATIVE | Facility: HOSPITAL | Age: 89
End: 2024-06-04
Payer: MEDICARE

## 2024-06-04 DIAGNOSIS — H57.02 ANISOCORIA: Primary | ICD-10-CM

## 2024-06-11 ENCOUNTER — OFFICE VISIT (OUTPATIENT)
Dept: FAMILY MEDICINE CLINIC | Age: 89
End: 2024-06-11
Payer: MEDICARE

## 2024-06-11 VITALS
SYSTOLIC BLOOD PRESSURE: 134 MMHG | HEART RATE: 57 BPM | HEIGHT: 66 IN | WEIGHT: 135 LBS | BODY MASS INDEX: 21.69 KG/M2 | DIASTOLIC BLOOD PRESSURE: 72 MMHG | OXYGEN SATURATION: 97 %

## 2024-06-11 DIAGNOSIS — G14 POSTPOLIO SYNDROME: ICD-10-CM

## 2024-06-11 DIAGNOSIS — M81.0 AGE-RELATED OSTEOPOROSIS WITHOUT CURRENT PATHOLOGICAL FRACTURE: ICD-10-CM

## 2024-06-11 DIAGNOSIS — G47.33 OBSTRUCTIVE SLEEP APNEA: ICD-10-CM

## 2024-06-11 DIAGNOSIS — I63.81 LACUNAR INFARCTION: ICD-10-CM

## 2024-06-11 DIAGNOSIS — I10 PRIMARY HYPERTENSION: Primary | ICD-10-CM

## 2024-06-11 PROBLEM — M79.642 PAIN OF LEFT HAND: Status: RESOLVED | Noted: 2023-12-13 | Resolved: 2024-06-11

## 2024-06-11 PROCEDURE — 1126F AMNT PAIN NOTED NONE PRSNT: CPT | Performed by: FAMILY MEDICINE

## 2024-06-11 PROCEDURE — 1159F MED LIST DOCD IN RCRD: CPT | Performed by: FAMILY MEDICINE

## 2024-06-11 PROCEDURE — 99214 OFFICE O/P EST MOD 30 MIN: CPT | Performed by: FAMILY MEDICINE

## 2024-06-11 PROCEDURE — G2211 COMPLEX E/M VISIT ADD ON: HCPCS | Performed by: FAMILY MEDICINE

## 2024-06-11 PROCEDURE — 1160F RVW MEDS BY RX/DR IN RCRD: CPT | Performed by: FAMILY MEDICINE

## 2024-06-11 RX ORDER — ASPIRIN 81 MG/1
81 TABLET ORAL DAILY
Qty: 90 TABLET | Refills: 3 | Status: SHIPPED | OUTPATIENT
Start: 2024-06-11

## 2024-06-11 NOTE — ASSESSMENT & PLAN NOTE
Gradually decline.  He renains very active and goes to the gym regularly.  Wears leg braces and ambulates with a cane.

## 2024-06-11 NOTE — PROGRESS NOTES
Chief Complaint  Hypertension    Subjective          Anirudh Lomeli presents to [facility] today for routine follow-up.  He is accompanied today by his wife Adriana.     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  He has been using gabapentin as well as an occasional Norco to control that pain.  He is not really using the Norco at this point.  Still taking the  He still has some numbness over the scalp and the L eyelid is still drooping.  Cool packs give him some relief.  This is giving him some problems with his vision at that time.  He is continuing to follow with Ophthalmology.  She is sending him for MRI head next week as he has some anisocoria.      He is on doxazosin for hypertension.  His BP has been well controlled.  No chest pain, palpitations, or shortness of breath.  He has had some edema in the past but that has resolved.       H/o lacunar infarct in the basal ganglia.  He has been evaluated by Neurology previously but was released to prn f/u. He was told to continue low-dose aspirin daily but has gotten away from doing that.     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  He wears leg braces of his own design.  He is quite active and goes to the gym regularly, although he laments that he is unable to do as much as he could once do.  He ambulates with a cane.      He is on Prolia for osteoporosis.  He is UTD on DEXA, last done 7/2023 and this showed osteoporosis, slightly worsened in the hip with R total hip T-score of -2.6.      He is on CPAP for AKASH.      Current Outpatient Medications:     B Complex Vitamins (vitamin b complex) capsule capsule, Take  by mouth Daily., Disp: , Rfl:     Calcium-Magnesium 250-125 MG tablet, Take  by mouth 2 (Two) Times a Day., Disp: , Rfl:     cholecalciferol (VITAMIN D3) 25 MCG (1000 UT)  "tablet, Take 1 tablet by mouth Daily., Disp: , Rfl:     Cyanocobalamin (B-12 PO), Take  by mouth., Disp: , Rfl:     doxazosin (CARDURA) 1 MG tablet, Take 1 tablet by mouth Every Night., Disp: 90 tablet, Rfl: 1    gabapentin (NEURONTIN) 300 MG capsule, Take 1 capsule by mouth 2 (Two) Times a Day As Needed (shingles pain)., Disp: 60 capsule, Rfl: 0    Glycerin PF (Oasis Tears PF) 0.22 % solution, Apply  to eye(s) as directed by provider., Disp: , Rfl:     meclizine (ANTIVERT) 25 MG tablet, Take 1 tablet by mouth 3 (Three) Times a Day As Needed for Dizziness., Disp: 30 tablet, Rfl: 0    niacin 500 MG tablet, Take 1 tablet by mouth 2 (Two) Times a Day., Disp: , Rfl:     potassium chloride (MICRO-K) 10 MEQ CR capsule, Take 1 capsule by mouth Daily., Disp: 90 capsule, Rfl: 1    prednisoLONE acetate (PRED FORTE) 1 % ophthalmic suspension, Administer 1 drop into the left eye 4 (Four) Times a Day., Disp: , Rfl:     Probiotic Product (PROBIOTIC-10 PO), Take 1 capsule by mouth Daily., Disp: , Rfl:     aspirin 81 MG EC tablet, Take 1 tablet by mouth Daily., Disp: 90 tablet, Rfl: 3    Current Facility-Administered Medications:     denosumab (PROLIA) syringe 60 mg, 60 mg, Subcutaneous, Q6 Months, Magali Worrell MD, 60 mg at 03/22/22 0903    denosumab (PROLIA) syringe 60 mg, 60 mg, Subcutaneous, Q6 Months, Magali Worrell MD, 60 mg at 05/15/24 0827  Medications Discontinued During This Encounter   Medication Reason    HYDROcodone-acetaminophen (NORCO) 5-325 MG per tablet *Therapy completed    vitamin C (ASCORBIC ACID) 250 MG tablet *Therapy completed         Allergies:  Patient has no known allergies.      Objective   Vital Signs:   Vitals:    06/11/24 0916   BP: 159/57   BP Location: Left arm   Patient Position: Sitting   Cuff Size: Adult   Pulse: 57   SpO2: 97%   Weight: 61.2 kg (135 lb)   Height: 167.6 cm (65.98\")     Body mass index is 21.8 kg/m².  BMI is within normal parameters. No other follow-up for BMI " required.        Physical Exam  Vitals reviewed.   Constitutional:       General: He is not in acute distress.     Appearance: Normal appearance. He is well-developed.   HENT:      Head: Normocephalic and atraumatic.      Right Ear: External ear normal.      Left Ear: External ear normal.   Eyes:      Extraocular Movements: Extraocular movements intact.      Conjunctiva/sclera: Conjunctivae normal.      Pupils: Pupils are equal, round, and reactive to light.   Cardiovascular:      Rate and Rhythm: Normal rate and regular rhythm.      Heart sounds: No murmur heard.  Pulmonary:      Effort: Pulmonary effort is normal.      Breath sounds: Normal breath sounds. No wheezing, rhonchi or rales.   Abdominal:      General: Bowel sounds are normal. There is no distension.      Palpations: Abdomen is soft.      Tenderness: There is no abdominal tenderness.   Musculoskeletal:         General: Normal range of motion.   Neurological:      Mental Status: He is alert.   Psychiatric:         Mood and Affect: Affect normal.           Lab Results   Component Value Date    GLUCOSE 114 (H) 05/02/2024    BUN 18 05/02/2024    CREATININE 0.85 05/02/2024    EGFRIFNONA 87 12/03/2021    EGFRIFAFRI 88 07/20/2018    BCR 21.2 05/02/2024    K 4.0 05/02/2024    CO2 29.0 05/02/2024    CALCIUM 9.0 05/02/2024    ALBUMIN 3.9 06/12/2023    LABIL2 1.3 (L) 02/05/2021    AST 24 06/12/2023    ALT 15 06/12/2023       Lab Results   Component Value Date    CHOL 155 07/27/2022    TRIG 56 07/27/2022    HDL 75 (H) 07/27/2022    LDL 68 07/27/2022       Lab Results   Component Value Date    WBC 4.28 06/12/2023    HGB 14.2 06/12/2023    HCT 40.8 06/12/2023    .2 (H) 06/12/2023     06/12/2023            Assessment and Plan    Assessment & Plan  Primary hypertension  Blood pressure has been running at goal.  Continue doxazosin 1 mg daily.  Refills were not needed today.  Labs were not needed today.  Continue to monitor.   Postpolio syndrome  Gradually  decline.  He renains very active and goes to the gym regularly.  Wears leg braces and ambulates with a cane.  Age-related osteoporosis without current pathological fracture  He is on Prolia for osteoporosis.  He is UTD on DEXA, last done 7/2023 and this showed osteoporosis, slightly worsened in the hip with R total hip T-score of -2.6.   Obstructive sleep apnea  Stable on CPAP.     Lacunar infarction  Evaluated by Neurology.  Recommendation made to continue aspirin daily which he has gotten away from.  Restarting that today.     New Medications Ordered This Visit   Medications    aspirin 81 MG EC tablet     Sig: Take 1 tablet by mouth Daily.     Dispense:  90 tablet     Refill:  3           Follow Up   Return in about 6 months (around 12/11/2024) for Medicare Wellness.  Patient was given instructions and counseling regarding his condition or for health maintenance advice. Please see specific information pulled into the AVS if appropriate.           06/11/2024

## 2024-06-11 NOTE — ASSESSMENT & PLAN NOTE
Evaluated by Neurology.  Recommendation made to continue aspirin daily which he has gotten away from.  Restarting that today.

## 2024-06-11 NOTE — ASSESSMENT & PLAN NOTE
He is on Prolia for osteoporosis.  He is UTD on DEXA, last done 7/2023 and this showed osteoporosis, slightly worsened in the hip with R total hip T-score of -2.6.

## 2024-06-11 NOTE — ASSESSMENT & PLAN NOTE
Blood pressure has been running at goal.  Continue doxazosin 1 mg daily.  Refills were not needed today.  Labs were not needed today.  Continue to monitor.

## 2024-06-17 DIAGNOSIS — B02.30 HERPES ZOSTER OPHTHALMICUS OF LEFT EYE: ICD-10-CM

## 2024-06-17 RX ORDER — GABAPENTIN 300 MG/1
300 CAPSULE ORAL 2 TIMES DAILY PRN
Qty: 60 CAPSULE | Refills: 0 | Status: SHIPPED | OUTPATIENT
Start: 2024-06-17

## 2024-06-17 NOTE — TELEPHONE ENCOUNTER
Caller: Anirudh Lomeli    Relationship: Self    Best call back number: [phone]    Requested Prescriptions:   Requested Prescriptions     Pending Prescriptions Disp Refills    gabapentin (NEURONTIN) 300 MG capsule 60 capsule 0     Sig: Take 1 capsule by mouth 2 (Two) Times a Day As Needed (shingles pain).        Pharmacy where request should be sent: [facility] [id number] [facility] [place] - [address] [facility] [phone]  - [phone] FX     Last office visit with prescribing clinician: 6/11/2024   Last telemedicine visit with prescribing clinician: 4/5/2024   Next office visit with prescribing clinician: 12/18/2024     Additional details provided by patient: PATIENT IS TOTALLY OUT OF MEDICATION AT THIS TIME.     Does the patient have less than a 3 day supply:  [x] Yes  [] No    Would you like a call back once the refill request has been completed: [] Yes [] No    If the office needs to give you a call back, can they leave a voicemail: [] Yes [] No    Arielle Larsen   06/17/24 08:04 EDT

## 2024-06-19 ENCOUNTER — HOSPITAL ENCOUNTER (OUTPATIENT)
Dept: MRI IMAGING | Facility: HOSPITAL | Age: 89
Discharge: HOME | End: 2024-06-19
Payer: MEDICARE

## 2024-06-19 DIAGNOSIS — H57.02 ANISOCORIA: ICD-10-CM

## 2024-06-19 LAB
CREAT BLDA-MCNC: 0.8 MG/DL (ref 0.6–1.3)
EGFRCR SERPLBLD CKD-EPI 2021: 85.1 ML/MIN/1.73

## 2024-06-19 PROCEDURE — A9577 INJ MULTIHANCE: HCPCS

## 2024-06-19 PROCEDURE — 70543 MRI ORBT/FAC/NCK W/O &W/DYE: CPT

## 2024-06-19 PROCEDURE — 70553 MRI BRAIN STEM W/O & W/DYE: CPT

## 2024-06-19 PROCEDURE — 82565 ASSAY OF CREATININE: CPT

## 2024-06-19 PROCEDURE — 0 GADOBENATE DIMEGLUMINE 529 MG/ML SOLUTION

## 2024-06-19 RX ADMIN — GADOBENATE DIMEGLUMINE 10 ML: 529 INJECTION, SOLUTION INTRAVENOUS at 10:04

## 2024-07-08 RX ORDER — DOXAZOSIN MESYLATE 1 MG/1
1 TABLET ORAL NIGHTLY
Qty: 90 TABLET | Refills: 1 | Status: SHIPPED | OUTPATIENT
Start: 2024-07-08

## 2024-07-16 DIAGNOSIS — B02.30 HERPES ZOSTER OPHTHALMICUS OF LEFT EYE: ICD-10-CM

## 2024-07-16 NOTE — TELEPHONE ENCOUNTER
Caller: MARGARET BROWN    Relationship: Emergency Contact    Best call back number: [phone]    Requested Prescriptions:   Requested Prescriptions     Pending Prescriptions Disp Refills    gabapentin (NEURONTIN) 300 MG capsule 60 capsule 0     Sig: Take 1 capsule by mouth 2 (Two) Times a Day As Needed (shingles pain).        Pharmacy where request should be sent: [facility] [id number] [facility] [place] - [address] [facility] - [phone]  - [phone] FX     Last office visit with prescribing clinician: 6/11/2024   Last telemedicine visit with prescribing clinician: 4/5/2024   Next office visit with prescribing clinician: 12/18/2024     Additional details provided by patient: PATIENT HAS ONE PILL LEFT. PLEASE SEND NEW PRESCRIPTION TO PHARMACY ASAP.    Does the patient have less than a 3 day supply:  [x] Yes  [] No    Would you like a call back once the refill request has been completed: [] Yes [] No    If the office needs to give you a call back, can they leave a voicemail: [] Yes [] No    Arielle Cooper Rep   07/16/24 09:49 EDT

## 2024-07-17 RX ORDER — GABAPENTIN 300 MG/1
300 CAPSULE ORAL 2 TIMES DAILY PRN
Qty: 60 CAPSULE | Refills: 0 | Status: SHIPPED | OUTPATIENT
Start: 2024-07-17

## 2024-07-25 ENCOUNTER — TELEPHONE (OUTPATIENT)
Dept: FAMILY MEDICINE CLINIC | Age: 89
End: 2024-07-25
Payer: MEDICARE

## 2024-07-25 DIAGNOSIS — B02.29 POST HERPETIC NEURALGIA: Primary | ICD-10-CM

## 2024-07-25 RX ORDER — LIDOCAINE 50 MG/G
1 PATCH TOPICAL EVERY 24 HOURS
Qty: 30 EACH | Refills: 2 | Status: SHIPPED | OUTPATIENT
Start: 2024-07-25

## 2024-07-25 RX ORDER — HYDROCODONE BITARTRATE AND ACETAMINOPHEN 5; 325 MG/1; MG/1
1 TABLET ORAL DAILY PRN
Qty: 12 TABLET | Refills: 0 | Status: SHIPPED | OUTPATIENT
Start: 2024-07-25

## 2024-07-25 NOTE — TELEPHONE ENCOUNTER
Pt is still having shingles pain.  Pt wants to know if this is normal 5 months after the rash is gone.  Pt's wife had to give him 1/2 tab of hydrocodone last night and this morning the pain was so bad. You sent in an rx for hydrocodone 5/10/24 #12 and he still has 4.5 pills left but she was wanting to know if you will send in a refill for him?

## 2024-07-25 NOTE — TELEPHONE ENCOUNTER
Caller: MARGARET BROWN    Relationship: Emergency Contact    Best call back number: [phone]    What is the best time to reach you: ANYTIME    Who are you requesting to speak with (clinical staff, provider,  specific staff member): NURSE       What was the call regarding: PATIENT IS STILL HAVING ISSUES WITH SHINGLES AND WOULD LIKE TO GET IN TODAY TO SEE PCP

## 2024-07-25 NOTE — TELEPHONE ENCOUNTER
Unfortunately, yes, it is not unheard of to have pain this far out.  Pain can improve up to a year.  Some people are unfortunately left with some amount of permanent pain from nerve damage.  I will be happy to refill his Machipongo for when the pain is very severe.  I will also send in some Lidoderm patches which are topical numbing patches that can provide good relief for post-shingles pain.  Thanks, CHAYITO

## 2024-07-29 ENCOUNTER — TELEPHONE (OUTPATIENT)
Dept: FAMILY MEDICINE CLINIC | Age: 89
End: 2024-07-29
Payer: MEDICARE

## 2024-08-15 DIAGNOSIS — B02.30 HERPES ZOSTER OPHTHALMICUS OF LEFT EYE: ICD-10-CM

## 2024-08-15 RX ORDER — GABAPENTIN 300 MG/1
300 CAPSULE ORAL 2 TIMES DAILY PRN
Qty: 60 CAPSULE | Refills: 2 | Status: SHIPPED | OUTPATIENT
Start: 2024-08-15

## 2024-08-15 NOTE — TELEPHONE ENCOUNTER
Caller: MARGARET BROWN    Relationship: Emergency Contact    Best call back number: [phone]    Requested Prescriptions:   Requested Prescriptions     Pending Prescriptions Disp Refills    gabapentin (NEURONTIN) 300 MG capsule 60 capsule 0     Sig: Take 1 capsule by mouth 2 (Two) Times a Day As Needed (shingles pain).        Pharmacy where request should be sent: [facility] [id number] [place] - [address] [facility] - [phone]  - [phone] FX     Last office visit with prescribing clinician: 6/11/2024   Last telemedicine visit with prescribing clinician: 4/5/2024   Next office visit with prescribing clinician: 12/18/2024       Does the patient have less than a 3 day supply:  [x] Yes  [] No    Would you like a call back once the refill request has been completed: [] Yes [x] No    If the office needs to give you a call back, can they leave a voicemail: [] Yes [x] No    Arielle Mack Rep   08/15/24 09:38 EDT

## 2024-08-29 ENCOUNTER — TELEPHONE (OUTPATIENT)
Dept: FAMILY MEDICINE CLINIC | Age: 89
End: 2024-08-29
Payer: MEDICARE

## 2024-08-29 NOTE — TELEPHONE ENCOUNTER
Post-shingles pain can unfortunately be very difficult to treat.  Is he using the Lidoderm patches at all?  If so, are they giving him any relief?  If the gabapentin is helping, we can increase that dose as well, as long as it is not making him too sleepy.  Thanks, CHAYITO

## 2024-08-29 NOTE — TELEPHONE ENCOUNTER
Caller: STEPHANIEMARGARET     Relationship: [unfilled]     Best call back number:     [phone]     What is your medical concern? PATIENT IS STILL EXPERIENCING PAIN ON THE TOP BACK HALF OF HIS HEAD FROM THE DIAGNOSIS OF SHINGLES BACK IN APRIL. PATIENT IS CURRENTLY TAKING GABAPENTIN AND PRN THE PATIENT IS BEING GIVEN 1/4 AND RECENTLY HAS INCREASED TO 1/2 OF A HYDROCODONE. PATIENT THIS 8.29.024 MORNING, SOUSE FEELS LIKE HIS PAIN IS GETTING WORSE.  HAS REFUSED GETTING UP OUT OF THE BED DUE TO THE PAIN ON THE HEAD. PATIENT CONTINUOUSLY KEEPS A COLD WASH CLOTH ON HIS HEAD TO HELP WITH THE PAIN AS WELL.       How long has this issue been going on? APRIL 2024    Is your provider already aware of this issue? YES    Have you been treated for this issue? YES

## 2024-08-29 NOTE — TELEPHONE ENCOUNTER
Spoke to pts wife and she had forgotten about the patches for a few days so she will go back to that because it seemed to help.  If after the weekend she thinks he needs an increased in the gabapentin she will call back.

## 2024-09-04 ENCOUNTER — TELEPHONE (OUTPATIENT)
Dept: FAMILY MEDICINE CLINIC | Age: 89
End: 2024-09-04
Payer: MEDICARE

## 2024-09-04 DIAGNOSIS — B02.30 HERPES ZOSTER OPHTHALMICUS OF LEFT EYE: ICD-10-CM

## 2024-09-04 RX ORDER — GABAPENTIN 300 MG/1
600 CAPSULE ORAL 2 TIMES DAILY PRN
Qty: 120 CAPSULE | Refills: 1 | Status: SHIPPED | OUTPATIENT
Start: 2024-09-04

## 2024-09-04 NOTE — TELEPHONE ENCOUNTER
Caller: MARGARET BROWN    Relationship: Emergency Contact    Best call back number: [phone]    Requested Prescriptions:   Requested Prescriptions      No prescriptions requested or ordered in this encounter      gabapentin (NEURONTIN)     600 MG       Pharmacy where request should be sent: [facility] [id number] - [place], [place] - [address] - [phone]  - [phone] FX     Last office visit with prescribing clinician: 6/11/2024   Last telemedicine visit with prescribing clinician: 4/5/2024   Next office visit with prescribing clinician: 12/18/2024     Additional details provided by patient:        THE PATIENT'S WIFE SAID THEY SPOKE TO PCP MELIZA ABOUT INCREASING THE DOSAGE FOR THE  GABAPENTIN. SHE SAID SHE HAS BEEN GIVING HIM 2 AND THEY HAVE BEEN HELPING. SHE IS WANTING PCP  TO SEND OVER GABAPENTIN  MG IF SHE FEELS IT WILL BE OK.     SHE SAID SHE HAS 2 1/2 DAYS LEFT       Does the patient have less than a 3 day supply:  [x] Yes  [] No    Would you like a call back once the refill request has been completed: [] Yes [x] No    If the office needs to give you a call back, can they leave a voicemail: [] Yes [x] No    Arielle Souza Rep   09/04/24 15:02 EDT

## 2024-09-19 ENCOUNTER — CLINICAL SUPPORT (OUTPATIENT)
Dept: FAMILY MEDICINE CLINIC | Age: 89
End: 2024-09-19
Payer: MEDICARE

## 2024-09-19 DIAGNOSIS — Z23 IMMUNIZATION DUE: Primary | ICD-10-CM

## 2024-09-19 PROCEDURE — G0008 ADMIN INFLUENZA VIRUS VAC: HCPCS | Performed by: FAMILY MEDICINE

## 2024-09-19 PROCEDURE — 90662 IIV NO PRSV INCREASED AG IM: CPT | Performed by: FAMILY MEDICINE

## 2024-10-15 RX ORDER — POTASSIUM CHLORIDE 750 MG/1
10 CAPSULE, EXTENDED RELEASE ORAL DAILY
Qty: 90 CAPSULE | Refills: 1 | Status: SHIPPED | OUTPATIENT
Start: 2024-10-15

## 2024-11-01 ENCOUNTER — TELEPHONE (OUTPATIENT)
Dept: FAMILY MEDICINE CLINIC | Age: 89
End: 2024-11-01
Payer: MEDICARE

## 2024-11-01 NOTE — TELEPHONE ENCOUNTER
Pt due for Prolia 11/15/24    Last Dexa -2.6  DEXA Bone Density Axial (07/11/2023 11:30)     Last CMP- Calcium 9.0  Basic metabolic panel (05/02/2024 09:41)     Last office Visit   Office Visit with Magali Worrell MD (06/11/2024)     Ok to proceed?

## 2024-11-14 ENCOUNTER — CLINICAL SUPPORT (OUTPATIENT)
Dept: FAMILY MEDICINE CLINIC | Age: 89
End: 2024-11-14
Payer: MEDICARE

## 2024-11-14 DIAGNOSIS — Z23 IMMUNIZATION DUE: Primary | ICD-10-CM

## 2024-11-14 PROCEDURE — 90480 ADMN SARSCOV2 VAC 1/ONLY CMP: CPT | Performed by: FAMILY MEDICINE

## 2024-11-14 PROCEDURE — 91320 SARSCV2 VAC 30MCG TRS-SUC IM: CPT | Performed by: FAMILY MEDICINE

## 2024-11-15 ENCOUNTER — TELEPHONE (OUTPATIENT)
Dept: FAMILY MEDICINE CLINIC | Age: 89
End: 2024-11-15
Payer: MEDICARE

## 2024-11-15 NOTE — TELEPHONE ENCOUNTER
Caller: MARGARET BROWN    Relationship to patient: Emergency Contact    Best call back number: [phone]     Chief complaint: PROLIA INJECTION    Type of visit: NURSE VISIT    Requested date: PROVIDER RECOMMENDATION     Additional notes:CALLER WAS WANTING TO KNOW WHEN NEXT INJECTION IS DUE AND GET IT SCHEDULED.     REGISTRATION UPDATED.

## 2024-11-25 ENCOUNTER — CLINICAL SUPPORT (OUTPATIENT)
Dept: FAMILY MEDICINE CLINIC | Age: 89
End: 2024-11-25
Payer: MEDICARE

## 2024-11-25 DIAGNOSIS — M81.0 AGE-RELATED OSTEOPOROSIS WITHOUT CURRENT PATHOLOGICAL FRACTURE: Primary | ICD-10-CM

## 2024-11-25 PROCEDURE — 96372 THER/PROPH/DIAG INJ SC/IM: CPT | Performed by: FAMILY MEDICINE

## 2024-12-18 ENCOUNTER — LAB (OUTPATIENT)
Dept: LAB | Facility: HOSPITAL | Age: 89
End: 2024-12-18
Payer: MEDICARE

## 2024-12-18 ENCOUNTER — OFFICE VISIT (OUTPATIENT)
Dept: FAMILY MEDICINE CLINIC | Age: 89
End: 2024-12-18
Payer: MEDICARE

## 2024-12-18 VITALS
TEMPERATURE: 97.6 F | SYSTOLIC BLOOD PRESSURE: 171 MMHG | DIASTOLIC BLOOD PRESSURE: 70 MMHG | BODY MASS INDEX: 23.3 KG/M2 | HEIGHT: 66 IN | WEIGHT: 145 LBS | HEART RATE: 54 BPM | OXYGEN SATURATION: 98 %

## 2024-12-18 DIAGNOSIS — M81.0 AGE-RELATED OSTEOPOROSIS WITHOUT CURRENT PATHOLOGICAL FRACTURE: ICD-10-CM

## 2024-12-18 DIAGNOSIS — I63.81 LACUNAR INFARCTION: ICD-10-CM

## 2024-12-18 DIAGNOSIS — D69.6 PLATELETS DECREASED: ICD-10-CM

## 2024-12-18 DIAGNOSIS — R82.998 FROTHY URINE: ICD-10-CM

## 2024-12-18 DIAGNOSIS — I10 PRIMARY HYPERTENSION: ICD-10-CM

## 2024-12-18 DIAGNOSIS — Z23 ENCOUNTER FOR IMMUNIZATION: ICD-10-CM

## 2024-12-18 DIAGNOSIS — G14 POSTPOLIO SYNDROME: ICD-10-CM

## 2024-12-18 DIAGNOSIS — Z00.00 ANNUAL PHYSICAL EXAM: Primary | ICD-10-CM

## 2024-12-18 DIAGNOSIS — G47.33 OBSTRUCTIVE SLEEP APNEA: ICD-10-CM

## 2024-12-18 LAB
ALBUMIN SERPL-MCNC: 3.9 G/DL (ref 3.5–5.2)
ALBUMIN/GLOB SERPL: 1.6 G/DL
ALP SERPL-CCNC: 74 U/L (ref 39–117)
ALT SERPL W P-5'-P-CCNC: 13 U/L (ref 1–41)
ANION GAP SERPL CALCULATED.3IONS-SCNC: 8 MMOL/L (ref 5–15)
AST SERPL-CCNC: 22 U/L (ref 1–40)
BACTERIA UR QL AUTO: NORMAL /HPF
BILIRUB SERPL-MCNC: 0.4 MG/DL (ref 0–1.2)
BILIRUB UR QL STRIP: NEGATIVE
BUN SERPL-MCNC: 14 MG/DL (ref 8–23)
BUN/CREAT SERPL: 15.6 (ref 7–25)
CALCIUM SPEC-SCNC: 9.5 MG/DL (ref 8.6–10.5)
CHLORIDE SERPL-SCNC: 104 MMOL/L (ref 98–107)
CLARITY UR: CLEAR
CO2 SERPL-SCNC: 30 MMOL/L (ref 22–29)
COLOR UR: YELLOW
CREAT SERPL-MCNC: 0.9 MG/DL (ref 0.76–1.27)
DEPRECATED RDW RBC AUTO: 49 FL (ref 37–54)
EGFRCR SERPLBLD CKD-EPI 2021: 81.6 ML/MIN/1.73
ERYTHROCYTE [DISTWIDTH] IN BLOOD BY AUTOMATED COUNT: 13 % (ref 12.3–15.4)
GLOBULIN UR ELPH-MCNC: 2.4 GM/DL
GLUCOSE SERPL-MCNC: 106 MG/DL (ref 65–99)
GLUCOSE UR STRIP-MCNC: NEGATIVE MG/DL
HCT VFR BLD AUTO: 40.1 % (ref 37.5–51)
HGB BLD-MCNC: 13.8 G/DL (ref 13–17.7)
HGB UR QL STRIP.AUTO: NEGATIVE
KETONES UR QL STRIP: NEGATIVE
LEUKOCYTE ESTERASE UR QL STRIP.AUTO: NEGATIVE
MCH RBC QN AUTO: 34.6 PG (ref 26.6–33)
MCHC RBC AUTO-ENTMCNC: 34.4 G/DL (ref 31.5–35.7)
MCV RBC AUTO: 100.5 FL (ref 79–97)
NITRITE UR QL STRIP: NEGATIVE
PH UR STRIP.AUTO: 7 [PH] (ref 5–8)
PLATELET # BLD AUTO: 155 10*3/MM3 (ref 140–450)
PMV BLD AUTO: 9.4 FL (ref 6–12)
POTASSIUM SERPL-SCNC: 4.2 MMOL/L (ref 3.5–5.2)
PROT SERPL-MCNC: 6.3 G/DL (ref 6–8.5)
PROT UR QL STRIP: NEGATIVE
RBC # BLD AUTO: 3.99 10*6/MM3 (ref 4.14–5.8)
RBC # UR STRIP: NORMAL /HPF
REF LAB TEST METHOD: NORMAL
SODIUM SERPL-SCNC: 142 MMOL/L (ref 136–145)
SP GR UR STRIP: 1.02 (ref 1–1.03)
SQUAMOUS #/AREA URNS HPF: NORMAL /HPF
UROBILINOGEN UR QL STRIP: NORMAL
WBC # UR STRIP: NORMAL /HPF
WBC NRBC COR # BLD AUTO: 5.17 10*3/MM3 (ref 3.4–10.8)

## 2024-12-18 PROCEDURE — 99214 OFFICE O/P EST MOD 30 MIN: CPT | Performed by: FAMILY MEDICINE

## 2024-12-18 PROCEDURE — 80053 COMPREHEN METABOLIC PANEL: CPT

## 2024-12-18 PROCEDURE — 1170F FXNL STATUS ASSESSED: CPT | Performed by: FAMILY MEDICINE

## 2024-12-18 PROCEDURE — 1125F AMNT PAIN NOTED PAIN PRSNT: CPT | Performed by: FAMILY MEDICINE

## 2024-12-18 PROCEDURE — 90677 PCV20 VACCINE IM: CPT | Performed by: FAMILY MEDICINE

## 2024-12-18 PROCEDURE — 81001 URINALYSIS AUTO W/SCOPE: CPT

## 2024-12-18 PROCEDURE — G0439 PPPS, SUBSEQ VISIT: HCPCS | Performed by: FAMILY MEDICINE

## 2024-12-18 PROCEDURE — 85027 COMPLETE CBC AUTOMATED: CPT

## 2024-12-18 PROCEDURE — 36415 COLL VENOUS BLD VENIPUNCTURE: CPT

## 2024-12-18 PROCEDURE — 1160F RVW MEDS BY RX/DR IN RCRD: CPT | Performed by: FAMILY MEDICINE

## 2024-12-18 PROCEDURE — 1159F MED LIST DOCD IN RCRD: CPT | Performed by: FAMILY MEDICINE

## 2024-12-18 PROCEDURE — G0009 ADMIN PNEUMOCOCCAL VACCINE: HCPCS | Performed by: FAMILY MEDICINE

## 2024-12-18 RX ORDER — DOXAZOSIN 1 MG/1
1 TABLET ORAL NIGHTLY
Qty: 90 TABLET | Refills: 1 | Status: SHIPPED | OUTPATIENT
Start: 2024-12-18

## 2024-12-18 NOTE — ASSESSMENT & PLAN NOTE
Gradual decline.  He remains very active and goes to the gym regularly.  Wears leg braces and ambulates with a cane.

## 2024-12-18 NOTE — PROGRESS NOTES
Subjective   The ABCs of the Annual Wellness Visit  Medicare Wellness Visit      Anirudh Lomeli is a 89 y.o. patient who presents for a Medicare Wellness Visit.  He is accompanied today by his wife Sarah.     Colonoscopy no longer indicated by age and history.  Prostate cancer screening no longer indicated by age and history.  He is UTD on DEXA, last done 7/2023 and this showed osteoporosis.  He is on Prolia.  He is UTD on COVID (11/2024), Pneumovax (10/2006), Qwmosgz30 (2/2016), Zostavax (6/2007), and flu (9/2024).  He is due for Prevnar 20, Shingrix, RSV, and Td.  He is UTD on routine labs including CMP.     The following portions of the patient's history were reviewed and   updated as appropriate: allergies, current medications, past family history, past medical history, past social history, past surgical history, and problem list.    Compared to one year ago, the patient's physical   health is worse.  Compared to one year ago, the patient's mental   health is the same.    Recent Hospitalizations:  He was not admitted to the hospital during the last year.     Current Medical Providers:  Patient Care Team:  Magali Worrell MD as PCP - General (Family Medicine)    Outpatient Medications Prior to Visit   Medication Sig Dispense Refill    B Complex Vitamins (vitamin b complex) capsule capsule Take  by mouth Daily.      Calcium-Magnesium 250-125 MG tablet Take  by mouth 2 (Two) Times a Day.      cholecalciferol (VITAMIN D3) 25 MCG (1000 UT) tablet Take 1 tablet by mouth Daily.      Cyanocobalamin (B-12 PO) Take  by mouth.      gabapentin (NEURONTIN) 300 MG capsule Take 2 capsules by mouth 2 (Two) Times a Day As Needed (shingles pain). 120 capsule 1    Glycerin PF (Oasis Tears PF) 0.22 % solution Apply  to eye(s) as directed by provider.      HYDROcodone-acetaminophen (NORCO) 5-325 MG per tablet Take 1 tablet by mouth Daily As Needed (shingles pain). 12 tablet 0    lidocaine (LIDODERM) 5 % Place 1 patch on  the skin as directed by provider Daily. Remove & Discard patch within 12 hours or as directed by MD 30 each 2    niacin 500 MG tablet Take 1 tablet by mouth 2 (Two) Times a Day.      potassium chloride (MICRO-K) 10 MEQ CR capsule TAKE 1 CAPSULE DAILY 90 capsule 1    prednisoLONE acetate (PRED FORTE) 1 % ophthalmic suspension Administer 1 drop into the left eye 4 (Four) Times a Day.      Probiotic Product (PROBIOTIC-10 PO) Take 1 capsule by mouth Daily.      doxazosin (CARDURA) 1 MG tablet TAKE 1 TABLET EVERY NIGHT 90 tablet 1    aspirin 81 MG EC tablet Take 1 tablet by mouth Daily. (Patient not taking: Reported on 12/18/2024) 90 tablet 3    meclizine (ANTIVERT) 25 MG tablet Take 1 tablet by mouth 3 (Three) Times a Day As Needed for Dizziness. (Patient not taking: Reported on 12/18/2024) 30 tablet 0     Facility-Administered Medications Prior to Visit   Medication Dose Route Frequency Provider Last Rate Last Admin    denosumab (PROLIA) syringe 60 mg  60 mg Subcutaneous Q6 Months Magali Worrell MD   60 mg at 03/22/22 0903    denosumab (PROLIA) syringe 60 mg  60 mg Subcutaneous Q6 Months Magali Worrell MD   60 mg at 11/25/24 0940     Opioid medication/s are on active medication list.  and I have evaluated his active treatment plan and pain score trends (see table).  Vitals:    12/18/24 0836   PainSc:   2   PainLoc: Eye     I have reviewed the chart for potential of high risk medication and harmful drug interactions in the elderly.        Aspirin is on active medication list. Aspirin use is indicated based on review of current medical condition/s. Pros and cons of this therapy have been discussed today. Benefits of this medication outweigh potential harm.  Patient has been encouraged to continue taking this medication.  .      Patient Active Problem List   Diagnosis    Obstructive sleep apnea    Primary hypertension    Postpolio syndrome    Presbycusis of both ears    Hypoxia, sleep related    Cyst  "of pancreas    Age-related osteoporosis without current pathological fracture    Chronic low back pain    Benign prostatic hyperplasia with nocturia    Lacunar infarction    Other constipation    Platelets decreased     Advance Care Planning Advance Directive is on file.  ACP discussion was held with the patient during this visit. Patient has an advance directive in EMR which is still valid.             Objective   Vitals:    24 0836 24 0920   BP: 145/79 171/70   BP Location: Left arm    Patient Position: Sitting    Cuff Size: Adult    Pulse: 54    Temp: 97.6 °F (36.4 °C)    TempSrc: Oral    SpO2: 98%    Weight: 65.8 kg (145 lb)    Height: 167.6 cm (65.98\")    PainSc:   2    PainLoc: Eye        Estimated body mass index is 23.41 kg/m² as calculated from the following:    Height as of this encounter: 167.6 cm (65.98\").    Weight as of this encounter: 65.8 kg (145 lb).    BMI is within normal parameters. No other follow-up for BMI required.           Does the patient have evidence of cognitive impairment? No                                                                                                Health  Risk Assessment    Smoking Status:  Social History     Tobacco Use   Smoking Status Former    Current packs/day: 0.00    Types: Cigarettes    Quit date: 1964    Years since quittin.0    Passive exposure: Past   Smokeless Tobacco Never     Alcohol Consumption:  Social History     Substance and Sexual Activity   Alcohol Use Yes       Fall Risk Screen  STEADI Fall Risk Assessment was completed, and patient is at HIGH risk for falls. Assessment completed on:2024    Depression Screening   Little interest or pleasure in doing things? Not at all   Feeling down, depressed, or hopeless? Not at all   PHQ-2 Total Score 0      Health Habits and Functional and Cognitive Screenin/18/2024     8:43 AM   Functional & Cognitive Status   Do you have difficulty preparing food and eating? No   Do you " have difficulty bathing yourself, getting dressed or grooming yourself? No   Do you have difficulty using the toilet? No   Do you have difficulty moving around from place to place? Yes   Do you have trouble with steps or getting out of a bed or a chair? Yes   Current Diet Well Balanced Diet   Dental Exam Up to date   Eye Exam Up to date   Exercise (times per week) 5 times per week   Current Exercises Include Stationary Bicycling/Spin Class;Light Weights   Do you need help using the phone?  Yes   Are you deaf or do you have serious difficulty hearing?  Yes   Do you need help to go to places out of walking distance? Yes   Do you need help shopping? No   Do you need help preparing meals?  No   Do you need help with housework?  No   Do you need help with laundry? No   Do you need help taking your medications? No   Do you need help managing money? No   Do you ever drive or ride in a car without wearing a seat belt? No   Have you felt unusual stress, anger or loneliness in the last month? Yes   Who do you live with? Spouse   If you need help, do you have trouble finding someone available to you? No   Have you been bothered in the last four weeks by sexual problems? No   Do you have difficulty concentrating, remembering or making decisions? Yes           Age-appropriate Screening Schedule:  Refer to the list below for future screening recommendations based on patient's age, sex and/or medical conditions. Orders for these recommended tests are listed in the plan section. The patient has been provided with a written plan.    Health Maintenance List  Health Maintenance   Topic Date Due    TDAP/TD VACCINES (1 - Tdap) Never done    ZOSTER VACCINE (1 of 2) Never done    RSV Vaccine - Adults (1 - 1-dose 75+ series) Never done    ANNUAL WELLNESS VISIT  12/18/2025    COVID-19 Vaccine  Completed    INFLUENZA VACCINE  Completed    Pneumococcal Vaccine 65+  Completed    LIPID PANEL  Discontinued                                          "                                                                                                       CMS Preventative Services Quick Reference  Risk Factors Identified During Encounter  Immunizations Discussed/Encouraged: Tdap, Prevnar 20 (Pneumococcal 20-valent conjugate), Shingrix, and RSV (Respiratory Syncytial Virus)    The above risks/problems have been discussed with the patient.  Pertinent information has been shared with the patient in the After Visit Summary.  An After Visit Summary and PPPS were made available to the patient.    Follow Up:   Next Medicare Wellness visit to be scheduled in 1 year.         Additional E&M Note during same encounter follows:  Patient has additional, significant, and separately identifiable condition(s)/problem(s) that require work above and beyond the Medicare Wellness Visit     Chief Complaint  Medicare Wellness-subsequent    Subjective   HPI  Anirudh is also being seen today for additional medical problem/s.    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  He has been using gabapentin as well as occasional Norco to control the pain.  Cool packs give him some relief.  He is continuing to follow with Ophthalmology for ongoing vision issues.  They have him on pilocarpine and prednisone otic gtt.     He has had kidney stones x2 in the past.  Has one nonobstructing 9 mm stone seen on recent U/S.  He reports \"frothy urine.\"    He has been taking Miralax daily.     He is on doxazosin for HTN.  His blood pressure has been well controlled.  Denies chest pain, palpitations, or shortness of breath.      H/o lacunar infarct in the basal ganglia.  He has been evaluated by Neurology previously but was released to prn f/u.  He has been taking aspirin sporadically.     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  He wears leg braces of his own design.  He is quite active " "and goes to the gym regularly, although he laments that he is unable to do as much as he could once do.  He ambulates with a cane.      He is on Prolia for osteoporosis.  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      He is on CPAP for sleep apnea but has not been able to use his machine because the strap presses on the area where his shingles occurs.    Review of Systems   Constitutional:  Negative for chills, fatigue and fever.   HENT:  Positive for hearing loss. Negative for congestion and rhinorrhea.    Eyes:  Negative for pain and visual disturbance.   Respiratory:  Negative for cough and shortness of breath.    Cardiovascular:  Negative for chest pain and palpitations.   Gastrointestinal:  Negative for abdominal pain, constipation, diarrhea, nausea and vomiting.   Genitourinary:  Negative for difficulty urinating and dysuria.   Musculoskeletal:  Positive for arthralgias. Negative for myalgias.   Skin:         +L facial pain   Neurological:  Positive for weakness. Negative for dizziness and numbness.        +imbalance due to postpolio   Psychiatric/Behavioral:  Negative for dysphoric mood and sleep disturbance. The patient is not nervous/anxious.               Objective   Vital Signs:  /70   Pulse 54   Temp 97.6 °F (36.4 °C) (Oral)   Ht 167.6 cm (65.98\")   Wt 65.8 kg (145 lb)   SpO2 98%   BMI 23.41 kg/m²   Physical Exam  Vitals reviewed.   Constitutional:       General: He is not in acute distress.     Appearance: Normal appearance. He is well-developed.   HENT:      Head: Normocephalic and atraumatic.      Right Ear: External ear normal.      Left Ear: External ear normal.      Mouth/Throat:      Mouth: Mucous membranes are moist.   Eyes:      Extraocular Movements: Extraocular movements intact.      Conjunctiva/sclera: Conjunctivae normal.      Pupils: Pupils are equal, round, and reactive to light.   Cardiovascular:      Rate " and Rhythm: Normal rate and regular rhythm.      Heart sounds: No murmur heard.  Pulmonary:      Effort: Pulmonary effort is normal.      Breath sounds: Normal breath sounds. No wheezing, rhonchi or rales.   Abdominal:      General: Bowel sounds are normal. There is no distension.      Palpations: Abdomen is soft.      Tenderness: There is no abdominal tenderness.   Musculoskeletal:         General: Normal range of motion.   Skin:     General: Skin is warm and dry.   Neurological:      Mental Status: He is alert and oriented to person, place, and time.      Deep Tendon Reflexes: Reflexes normal.   Psychiatric:         Mood and Affect: Mood and affect normal.         Behavior: Behavior normal.         Thought Content: Thought content normal.         Judgment: Judgment normal.       Lab Results   Component Value Date    GLUCOSE 114 (H) 05/02/2024    BUN 18 05/02/2024    CREATININE 0.80 06/19/2024    EGFR 85.1 06/19/2024    BCR 21.2 05/02/2024    K 4.0 05/02/2024    CO2 29.0 05/02/2024    CALCIUM 9.0 05/02/2024    ALBUMIN 3.9 06/12/2023    BILITOT 0.4 06/12/2023    AST 24 06/12/2023    ALT 15 06/12/2023       Lab Results   Component Value Date    CHOL 155 07/27/2022    TRIG 56 07/27/2022    HDL 75 (H) 07/27/2022    LDL 68 07/27/2022       Lab Results   Component Value Date    WBC 4.28 06/12/2023    HGB 14.2 06/12/2023    HCT 40.8 06/12/2023    .2 (H) 06/12/2023     06/12/2023                   Assessment and Plan            Annual physical exam  Colonoscopy no longer indicated by age and history.  Prostate cancer screening no longer indicated by age and history.  He is UTD on DEXA, last done 7/2023 and this showed osteoporosis.  He is on Prolia.  He is UTD on COVID (11/2024), Pneumovax (10/2006), Xmhawfx43 (2/2016), Zostavax (6/2007), and flu (9/2024).  He is due for Prevnar 20, Shingrix, RSV, and Td.  Prevnar 20 given today.  Shingrix can be done 1 year from his outbreak (March 2025).  RSV and Td can be  done at the pharmacy.  He is UTD on routine labs including CMP; ordered.        Age-related osteoporosis without current pathological fracture  He is on Prolia for osteoporosis. He is UTD on DEXA, last done 7/2023 and this showed osteoporosis, slightly worsened in the hip with R total hip T-score of -2.6.   Orders:    Comprehensive Metabolic Panel; Future    Primary hypertension    Blood pressure has been running at goal.  Continue doxazosin 1 mg nightly.  Refills were needed today.  Labs were needed today.  Continue to monitor.  Orders:    doxazosin (CARDURA) 1 MG tablet; Take 1 tablet by mouth Every Night.    Lacunar infarction  Evaluated by Neurology. Recommendation made to continue aspirin daily. Released to as needed follow-up.  Encouraged ongoing use of aspirin 81 mg daily.       Platelets decreased  Checking labs.  Orders:    CBC (No Diff); Future    Postpolio syndrome  Gradual decline.  He remains very active and goes to the gym regularly.  Wears leg braces and ambulates with a cane.       Obstructive sleep apnea  Stable on CPAP.       Frothy urine  Checking UA with micro.  Orders:    Urinalysis With Microscopic - Urine, Clean Catch; Future    Encounter for immunization    Orders:    Pneumococcal Conjugate Vaccine 20-Valent All            Follow Up   Return in about 6 months (around 6/18/2025) for Recheck.  Patient was given instructions and counseling regarding his condition or for health maintenance advice. Please see specific information pulled into the AVS if appropriate.

## 2024-12-18 NOTE — ASSESSMENT & PLAN NOTE
He is on Prolia for osteoporosis. He is UTD on DEXA, last done 7/2023 and this showed osteoporosis, slightly worsened in the hip with R total hip T-score of -2.6.   Orders:    Comprehensive Metabolic Panel; Future

## 2024-12-18 NOTE — ASSESSMENT & PLAN NOTE
Blood pressure has been running at goal.  Continue doxazosin 1 mg nightly.  Refills were needed today.  Labs were needed today.  Continue to monitor.  Orders:    doxazosin (CARDURA) 1 MG tablet; Take 1 tablet by mouth Every Night.

## 2024-12-18 NOTE — ASSESSMENT & PLAN NOTE
Evaluated by Neurology. Recommendation made to continue aspirin daily. Released to as needed follow-up.  Encouraged ongoing use of aspirin 81 mg daily.

## 2025-01-16 DIAGNOSIS — B02.30 HERPES ZOSTER OPHTHALMICUS OF LEFT EYE: ICD-10-CM

## 2025-01-16 RX ORDER — GABAPENTIN 300 MG/1
600 CAPSULE ORAL 2 TIMES DAILY PRN
Qty: 120 CAPSULE | Refills: 1 | Status: SHIPPED | OUTPATIENT
Start: 2025-01-16

## 2025-01-16 NOTE — TELEPHONE ENCOUNTER
Pt wife came in because her  is needing a refill on his gabapentin. She said he is currently in a lot of pain.

## 2025-01-20 RX ORDER — POTASSIUM CHLORIDE 750 MG/1
10 CAPSULE, EXTENDED RELEASE ORAL DAILY
Qty: 90 CAPSULE | Refills: 1 | Status: SHIPPED | OUTPATIENT
Start: 2025-01-20

## 2025-01-20 NOTE — TELEPHONE ENCOUNTER
Caller: MARGARET BROWN    Relationship: Emergency Contact    Best call back number: [phone]     Requested Prescriptions:   Requested Prescriptions     Pending Prescriptions Disp Refills    potassium chloride (MICRO-K) 10 MEQ CR capsule 90 capsule 1     Sig: Take 1 capsule by mouth Daily.        Pharmacy where request should be sent: [facility] - [address] [phone] [facility] [phone]      Last office visit with prescribing clinician: 12/18/2024   Last telemedicine visit with prescribing clinician: Visit date not found   Next office visit with prescribing clinician: 6/18/2025     Additional details provided by patient: REPORTS MAIL ORDER PHARMACY HAS CHANGED THIS YEAR -- NEEDS NEW PRESCRIPTION SENT (THEY WON'T/ CAN'T TRANSFER REMAINING REFILLS)      Arielle Hay Rep   01/20/25 11:39 EST

## 2025-04-07 ENCOUNTER — TELEPHONE (OUTPATIENT)
Dept: FAMILY MEDICINE CLINIC | Age: OVER 89
End: 2025-04-07
Payer: MEDICARE

## 2025-04-07 NOTE — TELEPHONE ENCOUNTER
Pt due for Prolia 5/25/25    Last Dexa -2.6  DEXA Bone Density Axial (07/11/2023 11:30)     Last CMP- Calcium 9.5  Comprehensive Metabolic Panel (12/18/2024 09:40)     Last office Visit   Office Visit with Magali Worrell MD (12/18/2024)     Ok to proceed?

## 2025-05-15 ENCOUNTER — OFFICE VISIT (OUTPATIENT)
Dept: FAMILY MEDICINE CLINIC | Age: OVER 89
End: 2025-05-15
Payer: MEDICARE

## 2025-05-15 VITALS
HEIGHT: 66 IN | TEMPERATURE: 99 F | BODY MASS INDEX: 23.33 KG/M2 | SYSTOLIC BLOOD PRESSURE: 128 MMHG | OXYGEN SATURATION: 94 % | WEIGHT: 145.2 LBS | DIASTOLIC BLOOD PRESSURE: 65 MMHG | HEART RATE: 57 BPM

## 2025-05-15 DIAGNOSIS — T16.1XXA FOREIGN BODY OF RIGHT EAR, INITIAL ENCOUNTER: ICD-10-CM

## 2025-05-15 DIAGNOSIS — R05.1 ACUTE COUGH: Primary | ICD-10-CM

## 2025-05-15 LAB
EXPIRATION DATE: NORMAL
FLUAV AG UPPER RESP QL IA.RAPID: NOT DETECTED
FLUBV AG UPPER RESP QL IA.RAPID: NOT DETECTED
INTERNAL CONTROL: NORMAL
Lab: NORMAL
SARS-COV-2 AG UPPER RESP QL IA.RAPID: NOT DETECTED

## 2025-05-15 NOTE — PROGRESS NOTES
"Chief Complaint  Cough (X 2 days) and URI (Chest congestion x 2 days )    Subjective          Anirudh Lomeli presents to [facility] today for acute complaint of cough for the past 2 days.   He is accompanied today by his wife Sarah.     Symptoms started when he went back to using his CPAP.  He has not been using it due to his scalp shingles which made it too painful to use the straps.  He reports positive fatigue and malaise, positive fevers (Tmax 100), negative chills, negative headaches, negative rhinorrhea, positive congestion, positive sore throat, negative changes in taste or smell.  He reports positive cough, productive of white sputum, negative chest pain today (Adriana reports +CP yesterday), positive chest congestion, positive shortness of breath, negative wheezing.  He has negative abdominal pain, negative nausea, negative vomiting, positive diarrhea, negative body aches.  Sick contacts:  None known but apparently there were some fellow patrons at [employer] that had tested positive for COVID.  He does not smoke.  Positive flu vaccine, positive COVID vaccine for 1143-4704.      Objective   Vital Signs:   Vitals:    05/15/25 1355   BP: 128/65   BP Location: Left arm   Patient Position: Sitting   Cuff Size: Adult   Pulse: 57   Temp: 99 °F (37.2 °C)   TempSrc: Oral   SpO2: 94%   Weight: 65.9 kg (145 lb 3.2 oz)   Height: 167.6 cm (65.98\")     BMI is within normal parameters. No other follow-up for BMI required.      Physical Exam  Vitals reviewed.   Constitutional:       General: He is not in acute distress.     Appearance: Normal appearance.   HENT:      Right Ear: Tympanic membrane, ear canal and external ear normal.      Left Ear: Tympanic membrane, ear canal and external ear normal.      Ears:      Comments: +hearing aid cover in R ear     Nose: Congestion and rhinorrhea present.      Right Turbinates: Swollen and pale.      Left Turbinates: Swollen and pale. "   Eyes:      Extraocular Movements: Extraocular movements intact.      Pupils: Pupils are equal, round, and reactive to light.   Cardiovascular:      Rate and Rhythm: Normal rate and regular rhythm.      Heart sounds: No murmur heard.  Pulmonary:      Effort: Pulmonary effort is normal.      Breath sounds: Normal breath sounds. No wheezing, rhonchi or rales.   Abdominal:      General: Bowel sounds are normal.      Tenderness: There is no abdominal tenderness.   Musculoskeletal:         General: Normal range of motion.   Neurological:      Mental Status: He is alert.               Assessment and Plan    Assessment & Plan  Acute cough  Viral URI.  Rapid COVID and flu testing were negative today.  No evidence of bacterial infection.  Symptomatic care recommended with OTC analgesics for pain/fever, OTC decongestants and cough suppressants prn.  Stay well hydrated.  Humidifier in the bedroom at night.  Hot tea with lemon and honey.  RTC prn worsening or failure to improve of sx.   Orders:    POCT SARS-CoV-2 Antigen GIOVANNY + Flu    Foreign body of right ear, initial encounter  Soft rubber hearing aid cap removed from R ear.  He tolerated this well.                   Follow Up   Return if symptoms worsen or fail to improve, for or as scheduled 7/9/2025.  Patient was given instructions and counseling regarding his condition or for health maintenance advice. Please see specific information pulled into the AVS if appropriate.          05/15/2025

## 2025-06-04 DIAGNOSIS — B02.30 HERPES ZOSTER OPHTHALMICUS OF LEFT EYE: ICD-10-CM

## 2025-06-04 RX ORDER — GABAPENTIN 300 MG/1
CAPSULE ORAL
Qty: 120 CAPSULE | Refills: 0 | Status: SHIPPED | OUTPATIENT
Start: 2025-06-04

## 2025-06-04 NOTE — TELEPHONE ENCOUNTER
Controlled refill request:  Requested Prescriptions     Pending Prescriptions Disp Refills    gabapentin (NEURONTIN) 300 MG capsule [Pharmacy Med Name: GABAPENTIN 300 MG CAPSULE] 120 capsule      Sig: TAKE 2 CAPSULES BY MOUTH TWICE A DAY AS NEEDED FOR SHINGLES PAIN      Last OV:  5/15/2025  Next OV:  7/9/2025  Last fill:  1/16/25  Last tox:  none within last year

## 2025-06-26 RX ORDER — POTASSIUM CHLORIDE 750 MG/1
10 CAPSULE, EXTENDED RELEASE ORAL DAILY
Qty: 90 CAPSULE | Refills: 1 | Status: SHIPPED | OUTPATIENT
Start: 2025-06-26

## 2025-07-09 ENCOUNTER — OFFICE VISIT (OUTPATIENT)
Dept: FAMILY MEDICINE CLINIC | Age: OVER 89
End: 2025-07-09
Payer: MEDICARE

## 2025-07-09 ENCOUNTER — LAB (OUTPATIENT)
Dept: LAB | Facility: HOSPITAL | Age: OVER 89
End: 2025-07-09
Payer: MEDICARE

## 2025-07-09 VITALS
SYSTOLIC BLOOD PRESSURE: 138 MMHG | TEMPERATURE: 98.2 F | HEART RATE: 57 BPM | BODY MASS INDEX: 23.69 KG/M2 | WEIGHT: 147.4 LBS | DIASTOLIC BLOOD PRESSURE: 72 MMHG | HEIGHT: 66 IN | OXYGEN SATURATION: 97 %

## 2025-07-09 DIAGNOSIS — R26.89 IMBALANCE: ICD-10-CM

## 2025-07-09 DIAGNOSIS — G47.33 OBSTRUCTIVE SLEEP APNEA: ICD-10-CM

## 2025-07-09 DIAGNOSIS — M81.0 AGE-RELATED OSTEOPOROSIS WITHOUT CURRENT PATHOLOGICAL FRACTURE: Primary | ICD-10-CM

## 2025-07-09 DIAGNOSIS — E55.9 VITAMIN D DEFICIENCY: ICD-10-CM

## 2025-07-09 DIAGNOSIS — G14 POSTPOLIO SYNDROME: ICD-10-CM

## 2025-07-09 DIAGNOSIS — M81.0 AGE-RELATED OSTEOPOROSIS WITHOUT CURRENT PATHOLOGICAL FRACTURE: ICD-10-CM

## 2025-07-09 PROBLEM — D69.6 PLATELETS DECREASED: Status: RESOLVED | Noted: 2024-12-18 | Resolved: 2025-07-09

## 2025-07-09 LAB
25(OH)D3 SERPL-MCNC: 101 NG/ML (ref 30–100)
ANION GAP SERPL CALCULATED.3IONS-SCNC: 9.4 MMOL/L (ref 5–15)
BUN SERPL-MCNC: 14 MG/DL (ref 8–23)
BUN/CREAT SERPL: 16.1 (ref 7–25)
CALCIUM SPEC-SCNC: 9.5 MG/DL (ref 8.6–10.5)
CHLORIDE SERPL-SCNC: 105 MMOL/L (ref 98–107)
CO2 SERPL-SCNC: 29.6 MMOL/L (ref 22–29)
CREAT SERPL-MCNC: 0.87 MG/DL (ref 0.76–1.27)
EGFRCR SERPLBLD CKD-EPI 2021: 82.5 ML/MIN/1.73
FOLATE SERPL-MCNC: >20 NG/ML (ref 4.78–24.2)
GLUCOSE SERPL-MCNC: 96 MG/DL (ref 65–99)
POTASSIUM SERPL-SCNC: 4.1 MMOL/L (ref 3.5–5.2)
SODIUM SERPL-SCNC: 144 MMOL/L (ref 136–145)
VIT B12 BLD-MCNC: >2000 PG/ML (ref 211–946)

## 2025-07-09 PROCEDURE — 80048 BASIC METABOLIC PNL TOTAL CA: CPT

## 2025-07-09 PROCEDURE — 36415 COLL VENOUS BLD VENIPUNCTURE: CPT

## 2025-07-09 PROCEDURE — 99214 OFFICE O/P EST MOD 30 MIN: CPT | Performed by: FAMILY MEDICINE

## 2025-07-09 PROCEDURE — 1125F AMNT PAIN NOTED PAIN PRSNT: CPT | Performed by: FAMILY MEDICINE

## 2025-07-09 PROCEDURE — 82607 VITAMIN B-12: CPT

## 2025-07-09 PROCEDURE — 82746 ASSAY OF FOLIC ACID SERUM: CPT

## 2025-07-09 PROCEDURE — G2211 COMPLEX E/M VISIT ADD ON: HCPCS | Performed by: FAMILY MEDICINE

## 2025-07-09 PROCEDURE — 82306 VITAMIN D 25 HYDROXY: CPT

## 2025-07-09 NOTE — PROGRESS NOTES
Chief Complaint  Hypertension (6 month f/u)    Patient or patient representative verbalized consent for the use of Ambient Listening during the visit with  Magali Worrell MD for chart documentation. 7/9/2025  08:32 EDT     Subjective          Anirudh Lomeli presents to [facility] today for follow-up of routine issues.  He is accompanied today by his wife Adriana.     History of Present Illness  The patient is an 89-year-old male here today for a routine follow-up.    He has been experiencing a decline in his walking ability, which he attributes to aging. He also mentions balance issues. He has been researching post-polio syndrome and its potential impact on his mobility. He is considering physical therapy as a treatment option. 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 He ambulates with a cane and tries to remain as active as possible.    He has sleep apnea for which he uses CPAP nightly. He has been using melatonin sublingually at bedtime to aid sleep, which he finds beneficial. However, he did not sleep well last night after taking a 3 mg dose of melatonin orally. He notes that when he sleeps well, his walking improves the following day.    He is on alendronate for osteoporosis but recently switched from Fosamax to Prolia. He is due for a repeat DEXA scan this month. He is currently taking alendronate and vitamin D supplements. He was previously on Prolia but was switched to vitamin D by his oncologist, Dr. Fregoso, for a period of 6 months to a year. He has been diagnosed with osteopenia and takes calcium supplements twice daily.    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 He occasionally uses a tab of Norco, but in general, the gabapentin controls the condition well. He reports that his eyesight has been  affected, possibly due to the lingering effects of shingles, and finds relief with sunglasses. He plans to have his glasses checked soon.    He is on doxazosin for hypertension.    He has been taking niacin for high cholesterol for approximately 20 years. He does not experience flushing with niacin anymore.            Current Outpatient Medications:     alendronate (FOSAMAX) 70 MG tablet, Take 1 tablet by mouth Every 7 (Seven) Days. Take with a big glass of water and then remain upright for 30 minutes afterward., Disp: 12 tablet, Rfl: 3    B Complex Vitamins (vitamin b complex) capsule capsule, Take  by mouth Daily., Disp: , Rfl:     Calcium-Magnesium 250-125 MG tablet, Take  by mouth 2 (Two) Times a Day., Disp: , Rfl:     cholecalciferol (VITAMIN D3) 25 MCG (1000 UT) tablet, Take 1 tablet by mouth Daily., Disp: , Rfl:     doxazosin (CARDURA) 1 MG tablet, Take 1 tablet by mouth Every Night. (Patient taking differently: Take 1 tablet by mouth As Needed.), Disp: 90 tablet, Rfl: 1    gabapentin (NEURONTIN) 300 MG capsule, TAKE 2 CAPSULES BY MOUTH TWICE A DAY AS NEEDED FOR SHINGLES PAIN, Disp: 120 capsule, Rfl: 0    Glycerin PF (Oasis Tears PF) 0.22 % solution, Apply  to eye(s) as directed by provider., Disp: , Rfl:     HYDROcodone-acetaminophen (NORCO) 5-325 MG per tablet, Take 1 tablet by mouth Daily As Needed (shingles pain)., Disp: 12 tablet, Rfl: 0    lidocaine (LIDODERM) 5 %, Place 1 patch on the skin as directed by provider Daily. Remove & Discard patch within 12 hours or as directed by MD (Patient taking differently: Place 1 patch on the skin as directed by provider Every Other Day. Remove & Discard patch within 12 hours or as directed by MD), Disp: 30 each, Rfl: 2    melatonin 3 MG tablet, Take 1 tablet by mouth At Night As Needed for Sleep., Disp: , Rfl:     niacin 500 MG tablet, Take 1 tablet by mouth 2 (Two) Times a Day., Disp: , Rfl:     potassium chloride (MICRO-K) 10 MEQ CR capsule, Take 1 capsule by  "mouth Daily., Disp: 90 capsule, Rfl: 1    prednisoLONE acetate (PRED FORTE) 1 % ophthalmic suspension, Administer 1 drop into the left eye Daily., Disp: , Rfl:     Zoster Vac Recomb Adjuvanted (Shingrix) 50 MCG/0.5ML reconstituted suspension, Inject 0.5 mL into the appropriate muscle as directed by prescriber., Disp: 1 each, Rfl: 1  Medications Discontinued During This Encounter   Medication Reason    Probiotic Product (PROBIOTIC-10 PO) Historical Med - Therapy completed         Allergies:  Patient has no known allergies.      Objective   Vital Signs:   Vitals:    07/09/25 0800   BP: 138/72   Pulse: 57   Temp: 98.2 °F (36.8 °C)   TempSrc: Oral   SpO2: 97%   Weight: 66.9 kg (147 lb 6.4 oz)   Height: 167.6 cm (65.98\")     Body mass index is 23.8 kg/m².  BMI is within normal parameters. No other follow-up for BMI required.        Physical Exam  Vitals reviewed.   Constitutional:       General: He is not in acute distress.     Appearance: Normal appearance. He is well-developed.   HENT:      Head: Normocephalic and atraumatic.      Right Ear: External ear normal.      Left Ear: External ear normal.   Eyes:      Extraocular Movements: Extraocular movements intact.      Conjunctiva/sclera: Conjunctivae normal.      Pupils: Pupils are equal, round, and reactive to light.   Cardiovascular:      Rate and Rhythm: Normal rate and regular rhythm.      Heart sounds: No murmur heard.  Pulmonary:      Effort: Pulmonary effort is normal.      Breath sounds: Normal breath sounds. No wheezing, rhonchi or rales.   Abdominal:      General: Bowel sounds are normal. There is no distension.      Palpations: Abdomen is soft.      Tenderness: There is no abdominal tenderness.   Musculoskeletal:         General: Normal range of motion.   Neurological:      Mental Status: He is alert.   Psychiatric:         Mood and Affect: Affect normal.           Lab Results   Component Value Date    GLUCOSE 106 (H) 12/18/2024    BUN 14 12/18/2024    " CREATININE 0.90 12/18/2024    EGFRIFNONA 87 12/03/2021    EGFRIFAFRI 88 07/20/2018    BCR 15.6 12/18/2024    K 4.2 12/18/2024    CO2 30.0 (H) 12/18/2024    CALCIUM 9.5 12/18/2024    ALBUMIN 3.9 12/18/2024    AST 22 12/18/2024    ALT 13 12/18/2024       Lab Results   Component Value Date    CHOL 155 07/27/2022    TRIG 56 07/27/2022    HDL 75 (H) 07/27/2022    LDL 68 07/27/2022       Lab Results   Component Value Date    WBC 5.17 12/18/2024    HGB 13.8 12/18/2024    HCT 40.1 12/18/2024    .5 (H) 12/18/2024     12/18/2024            Assessment and Plan    Assessment & Plan  Age-related osteoporosis without current pathological fracture    Orders:    DEXA Bone Density Axial; Future    Basic Metabolic Panel; Future    Postpolio syndrome    Orders:    Ambulatory Referral to Physical Therapy for Evaluation & Treatment    Imbalance    Orders:    Vitamin B12 & Folate; Future    Ambulatory Referral to Physical Therapy for Evaluation & Treatment    Vitamin D deficiency    Orders:    Vitamin D,25-Hydroxy; Future    Obstructive sleep apnea                 Assessment & Plan  Post-polio syndrome  - Balance issues likely exacerbated by fatigue  - Physical therapy referral to help manage symptoms  - Consider further options such as a neuromuscular neurologist or a post-polio clinic if physical therapy does not provide sufficient relief  - Uses leg braces and ambulates with a cane    Sleep apnea  - Uses CPAP nightly    Osteoporosis  - Currently on alendronate but interested in returning to Prolia injections due to difficulty remembering the weekly dose  - Repeat DEXA scan to be scheduled as it has been 2 years since the last one  - Basic metabolic panel and calcium level to be checked today to monitor response to the new medication  - Vitamin D and B12 levels to be checked    Postherpetic neuralgia  - Continues to use gabapentin for postherpetic neuralgia from herpes zoster ophthalmicus 15 months ago  - Gabapentin  generally controlling symptoms well, occasionally uses Norco for breakthrough pain  - Sunglasses help with light sensitivity due to lingering nerve damage affecting eyesight    Hypertension  - On doxazosin for hypertension    High cholesterol  - Has been taking niacin for high cholesterol for approximately 20 years  - Does not experience flushing with niacin anymore  - Not indicated, and he may stop this    Health maintenance  - Due for a shingles vaccine, will receive the first dose today  - Second dose to be administered in 2 to 6 months  - Labs to be drawn today do not require fasting        Follow Up   Return in about 6 months (around 1/9/2026) for Medicare Wellness.  Patient was given instructions and counseling regarding his condition or for health maintenance advice. Please see specific information pulled into the AVS if appropriate.           07/09/2025

## 2025-07-10 ENCOUNTER — TELEPHONE (OUTPATIENT)
Dept: FAMILY MEDICINE CLINIC | Age: OVER 89
End: 2025-07-10
Payer: MEDICARE

## 2025-07-10 NOTE — TELEPHONE ENCOUNTER
Please call Anirudh.    The best options I found for postpolio clinics are as follows:  [facility] -- [phone]  [facility] in [place], [place] -- [phone]    Hopefully he and Adriana can reach out to these clinics if they are interested!  Thanks, [email or web address]

## 2025-08-12 DIAGNOSIS — B02.30 HERPES ZOSTER OPHTHALMICUS OF LEFT EYE: ICD-10-CM

## 2025-08-12 RX ORDER — GABAPENTIN 300 MG/1
CAPSULE ORAL
Qty: 120 CAPSULE | Refills: 0 | Status: SHIPPED | OUTPATIENT
Start: 2025-08-12